# Patient Record
Sex: MALE | Race: WHITE | NOT HISPANIC OR LATINO | Employment: PART TIME | URBAN - METROPOLITAN AREA
[De-identification: names, ages, dates, MRNs, and addresses within clinical notes are randomized per-mention and may not be internally consistent; named-entity substitution may affect disease eponyms.]

---
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
## 2022-02-17 ENCOUNTER — TELEPHONE (OUTPATIENT)
Dept: BEHAVIORAL HEALTH | Facility: CLINIC | Age: 22
End: 2022-02-17
Payer: COMMERCIAL

## 2022-02-21 ENCOUNTER — HOSPITAL ENCOUNTER (OUTPATIENT)
Dept: BEHAVIORAL HEALTH | Facility: CLINIC | Age: 22
Discharge: HOME | End: 2022-02-21
Attending: FAMILY MEDICINE | Admitting: FAMILY MEDICINE
Payer: COMMERCIAL

## 2022-02-21 ENCOUNTER — TELEPHONE (OUTPATIENT)
Dept: BEHAVIORAL HEALTH | Facility: CLINIC | Age: 22
End: 2022-02-21
Payer: COMMERCIAL

## 2022-02-21 DIAGNOSIS — F41.1 GENERALIZED ANXIETY DISORDER: Primary | ICD-10-CM

## 2022-02-21 DIAGNOSIS — F33.41 MAJOR DEPRESSIVE DISORDER, RECURRENT EPISODE, IN PARTIAL REMISSION (H): ICD-10-CM

## 2022-02-21 PROCEDURE — 90791 PSYCH DIAGNOSTIC EVALUATION: CPT | Mod: GT,95 | Performed by: COUNSELOR

## 2022-02-21 RX ORDER — VENLAFAXINE HYDROCHLORIDE 150 MG/1
1 CAPSULE, EXTENDED RELEASE ORAL DAILY
COMMUNITY
Start: 2022-02-11 | End: 2023-03-13

## 2022-02-21 RX ORDER — DEXTROAMPHETAMINE SACCHARATE, AMPHETAMINE ASPARTATE MONOHYDRATE, DEXTROAMPHETAMINE SULFATE AND AMPHETAMINE SULFATE 3.75; 3.75; 3.75; 3.75 MG/1; MG/1; MG/1; MG/1
CAPSULE, EXTENDED RELEASE ORAL
COMMUNITY
Start: 2022-02-11

## 2022-02-21 ASSESSMENT — ANXIETY QUESTIONNAIRES
5. BEING SO RESTLESS THAT IT IS HARD TO SIT STILL: NEARLY EVERY DAY
3. WORRYING TOO MUCH ABOUT DIFFERENT THINGS: NEARLY EVERY DAY
1. FEELING NERVOUS, ANXIOUS, OR ON EDGE: NEARLY EVERY DAY
7. FEELING AFRAID AS IF SOMETHING AWFUL MIGHT HAPPEN: NEARLY EVERY DAY
4. TROUBLE RELAXING: NEARLY EVERY DAY
GAD7 TOTAL SCORE: 21
GAD7 TOTAL SCORE: 21
6. BECOMING EASILY ANNOYED OR IRRITABLE: NEARLY EVERY DAY
GAD7 TOTAL SCORE: 21
2. NOT BEING ABLE TO STOP OR CONTROL WORRYING: NEARLY EVERY DAY
7. FEELING AFRAID AS IF SOMETHING AWFUL MIGHT HAPPEN: NEARLY EVERY DAY

## 2022-02-21 ASSESSMENT — PATIENT HEALTH QUESTIONNAIRE - PHQ9
SUM OF ALL RESPONSES TO PHQ QUESTIONS 1-9: 27
10. IF YOU CHECKED OFF ANY PROBLEMS, HOW DIFFICULT HAVE THESE PROBLEMS MADE IT FOR YOU TO DO YOUR WORK, TAKE CARE OF THINGS AT HOME, OR GET ALONG WITH OTHER PEOPLE: EXTREMELY DIFFICULT
SUM OF ALL RESPONSES TO PHQ QUESTIONS 1-9: 27

## 2022-02-21 ASSESSMENT — COLUMBIA-SUICIDE SEVERITY RATING SCALE - C-SSRS
1. IN THE PAST MONTH, HAVE YOU WISHED YOU WERE DEAD OR WISHED YOU COULD GO TO SLEEP AND NOT WAKE UP?: YES
3. HAVE YOU BEEN THINKING ABOUT HOW YOU MIGHT KILL YOURSELF?: NO
6. HAVE YOU EVER DONE ANYTHING, STARTED TO DO ANYTHING, OR PREPARED TO DO ANYTHING TO END YOUR LIFE?: NO
2. HAVE YOU ACTUALLY HAD ANY THOUGHTS OF KILLING YOURSELF IN THE PAST MONTH?: NO
5. HAVE YOU STARTED TO WORK OUT OR WORKED OUT THE DETAILS OF HOW TO KILL YOURSELF? DO YOU INTEND TO CARRY OUT THIS PLAN?: NO
4. HAVE YOU HAD THESE THOUGHTS AND HAD SOME INTENTION OF ACTING ON THEM?: NO

## 2022-02-21 NOTE — PROGRESS NOTES
"[facility]  Provider Name:  Samanta Little     Credentials:  [facility]    PATIENT'S NAME: Elan Rao  PREFERRED NAME: Elan  PRONOUNS: he/him/his     MRN: [id number]  : 2000  ADDRESS: [address] [place] [id number]  ACCT. NUMBER:  [id number]  DATE OF SERVICE: 22  START TIME: 1100 am  END TIME: 1230 pm  PREFERRED PHONE: [phone]  May we leave a program related message: Yes  SERVICE MODALITY:  Video Visit:      Provider verified identity through the following two step process.  Patient provided:  Patient  and Patient address    Telemedicine Visit: The patient's condition can be safely assessed and treated via synchronous audio and visual telemedicine encounter.      Reason for Telemedicine Visit: Services only offered telehealth    Originating Site (Patient Location): Patient's home    Distant Site (Provider Location): Provider Remote Setting- Home Office    Consent:  The patient/guardian has verbally consented to: the potential risks and benefits of telemedicine (video visit) versus in person care; bill my insurance or make self-payment for services provided; and responsibility for payment of non-covered services.     Patient would like the video invitation sent by:  My Chart    Mode of Communication:  Video Conference via well    As the provider I attest to compliance with applicable laws and regulations related to telemedicine.    UNIVERSAL ADULT Mental Health DIAGNOSTIC ASSESSMENT    Identifying Information:  Patient is a 21 year old,  .  The pronoun use throughout this assessment reflects the patient's chosen pronoun.  Patient was referred for an assessment by hospital.  Patient attended the session alone.    Chief Complaint:   The reason for seeking services at this time is: \"Depression/Anxiety\".  The problem(s) began 21.    Patient has attempted to resolve these concerns in the past through " "psychiatry.    Social/Family History:  Patient reported they grew up in [place]  .  They were raised by biological parents  .  Parents  / .  Patient reported that their childhood was \"tough - diagnosed with Tourette's and had social anxiety\".  Patient described their current relationships with family of origin as \"mom is really supportive and helpful - dad is not as healthy\".     The patient describes their cultural background as .  Cultural influences and impact on patient's life structure, values, norms, and healthcare: Non Baptism.  Contextual influences on patient's health include: Individual Factors Chronic Anxiety.    These factors will be addressed in the Preliminary Treatment plan. Patient identified their preferred language to be English. Patient reported they does not need the assistance of an  or other support involved in therapy.     Patient reported had no significant delays in developmental tasks.   Patient's highest education level was high school graduate  .  Patient identified the following learning problems: attention and concentration.  Modifications will not be used to assist communication in therapy.  Patient reports they are  able to understand written materials.    Patient reported the following relationship history:  Patient has been \"single my whole life\".  Patient's current relationship status is single for \"my whole\".   Patient identified their sexual orientation as heterosexual.  Patient reported having   0 child(alexandria). Patient identified parents; pets as part of their support system.  Patient identified the quality of these relationships as fair,  .      Patient's current living/housing situation involves staying in own home/apartment.  Patient reports living in an apartment with \"one my best friends\" - patient reports staying with mom when having \"bad days\" and they report that housing is stable.    Patient is currently employed fulltime.  " Patient reports their finances are obtained through employment. Patient does not identify finances as a current stressor.      Patient reported that they have not been involved with the legal system.  Patient does not report being under probation/ parole/ jurisdiction. They are not under any current court jurisdiction. .    Patient's Strengths and Limitations:  Patient identified the following strengths or resources that will help them succeed in treatment: commitment to health and well being. Things that may interfere with the patient's success in treatment include: none identified.     Assessments:  The following assessments were completed by patient for this visit:  PHQ9:   PHQ-9 SCORE 2/20/2020 10/5/2021 2/21/2022   PHQ-9 Total Score MyChart - - 27 (Severe depression)   PHQ-9 Total Score - 24 27   PHQ-A Total Score 4 - -     GAD7:   MIGUEL A-7 SCORE 10/5/2021 2/21/2022   Total Score - 21 (severe anxiety)   Total Score 21 21   Answers for HPI/ROS submitted by the patient on 2/21/2022  If you checked off any problems, how difficult have these problems made it for you to do your work, take care of things at home, or get along with other people?: Extremely difficult  PHQ9 TOTAL SCORE: 27  MIGUEL A 7 TOTAL SCORE: 21  CAGE-AID:   CAGE-AID Total Score 2/21/2022   Total Score 0     Harris Suicide Severity Rating Scale (Lifetime/Recent)  Harris Suicide Severity Rating (Lifetime/Recent) 2/21/2022   Q1 Wished to be Dead (Past Month) yes   Q2 Suicidal Thoughts (Past Month) no   Q3 Suicidal Thought Method no   Q4 Suicidal Intent without Specific Plan no   Q5 Suicide Intent with Specific Plan no   Q6 Suicide Behavior (Lifetime) no   Level of Risk per Screen low risk     WHODAS 2.0 Total Score 2/21/2022   Total Score 42   Total Score MyChart 42     Personal and Family Medical History:  Patient does report a family history of mental health concerns.  Patient reports family history includes Asthma in his mother; Depression in his  "father; Schizophrenia in his father; Substance Abuse in his father..     Patient does report Mental Health Diagnosis and/or Treatment.  Patient Patient reported the following previous diagnoses which include(s): ADHD; an anxiety disorder; depression; obsessive compulsive disorder .  Patient reported symptoms began \"my whole life\".  Patient has received mental health services in the past:  therapy; Behavioral Health Clinician; psychiatry  .  Psychiatric Hospitalizations: none .   Currently, patient is receiving other mental health services.  These include psychiatry with Ashley NUÑEZ with [facility].  Next appointment: in the next couple of days.       Patient has had a physical exam to rule out medical causes for current symptoms.  Date of last physical exam was within the past year. Client was encouraged to follow up with PCP if symptoms were to develop. The patient has a [place] Primary Care Provider, who is named Manuelito Delacruz MD  .  Patient reports no current medical and/or dental concerns.  Patient denies any issues with pain..   There are not significant appetite / nutritional concerns / weight changes.   Patient does not report a history of head injury / trauma / cognitive impairment.      Patient reports current meds as:   Outpatient Medications Marked as Taking for the 2/21/22 encounter (Hospital Encounter) with Samanta Little [facility]   Medication Sig     amphetamine-dextroamphetamine (ADDERALL XR) 15 MG 24 hr capsule      clonazePAM (KLONOPIN) 1 MG tablet      cloNIDine (CATAPRES) 0.1 MG tablet Take 1 tablet (0.1 mg) by mouth At Bedtime. May also take 0.5 tablets (0.05 mg) at bedtime as needed, may repeat once (for insomnia in the middle of the night).     Melatonin (MELATONIN TR) 10 MG TBCR Take 1 tablet by mouth every evening     traZODone (DESYREL) 50 MG tablet Take 1 tablet by mouth 1 hour before bedtime     venlafaxine (EFFEXOR-XR) 150 MG 24 hr capsule Take 1 capsule by mouth daily "       Medication Adherence:  Patient reports taking.  taking prescribed medications as prescribed.    Patient Allergies:  No Known Allergies    Medical History:    Past Medical History:   Diagnosis Date     Tourette syndrome          Current Mental Status Exam:   Appearance:  Appropriate    Eye Contact:  Good   Psychomotor:  Normal       Gait / station:  no problem  Attitude / Demeanor: Cooperative  Interested  Speech      Rate / Production: Normal/ Responsive      Volume:  Normal  volume      Language:  intact and no problems  Mood:   Normal  Affect:   Appropriate    Thought Content: Clear   Thought Process: Logical       Associations: No loosening of associations  Insight:   Good   Judgment:  Intact   Orientation:  All  Attention/concentration: Good      Substance Use:  Patient did report a family history of substance use concerns; see medical history section for details.  Patient has not received chemical dependency treatment in the past.  Patient has ever been to detox.      Patient is not currently receiving any chemical dependency treatment.         Substance History of use Age of first use Date of last use     Pattern and duration of use (include amounts and frequency)   Alcohol never used       REPORTS SUBSTANCE USE: N/A   Cannabis   currently use 20 02/20/22 REPORTS SUBSTANCE USE: reports using substance 2 times per day and has .25 to .5 tablet . at a time.   Patient reports heaviest use is current use.   Patient reports using medical marijuana and uses .25 tabs in the morning and half a tab at night for tourettes syndrome.  Patient reports it helps with anxiety and focus.   Amphetamines   Never used outside of prescription    REPORTS SUBSTANCE USE: N/A   Cocaine/crack    never used       REPORTS SUBSTANCE USE: N/A   Hallucinogens never used         REPORTS SUBSTANCE USE: N/A   Inhalants never used         REPORTS SUBSTANCE USE: N/A   Heroin never used         REPORTS SUBSTANCE USE: N/A   Other Opiates  never used     REPORTS SUBSTANCE USE: N/A   Benzodiazepine   Currently prescriped and denies use outside of prescriptions   REPORTS SUBSTANCE USE: N/A   Barbiturates never used     REPORTS SUBSTANCE USE: N/A   Over the counter meds never used     REPORTS SUBSTANCE USE: N/A   Caffeine currently use 14  2/21/22 REPORTS SUBSTANCE USE: reports using substance 2 times per day and has 1 cup of coffe at a time.   Patient reports heaviest use was in the past year and patient has cut down use of energy drinks.   Nicotine  never used     REPORTS SUBSTANCE USE: N/A   Other substances not listed above:  Identify:  never used     REPORTS SUBSTANCE USE: N/A     Patient reported the following problems as a result of their substance use: family problems.    Substance Use: No symptoms    Based on the negative CAGE score and clinical interview there  are not indications of drug or alcohol abuse.  Patient reports concern with caffeine intake and mom has made comments regarding use.  Patient reports cutting down use.    Significant Losses / Trauma / Abuse / Neglect Issues:   Patient did not  serve in the .  There are indications or report of significant loss, trauma, abuse or neglect issues related to: are no indications and client denies any losses, trauma, abuse, or neglect concerns.  Concerns for possible neglect are not present.     Safety Assessment:   Patient denies current homicidal ideation and behaviors.  Patient denies current self-injurious ideation and behaviors.    Patient denied risk behaviors associated with substance use.  Patient denies any high risk behaviors associated with mental health symptoms.  Patient reports the following current concerns for their personal safety: None.  Patient reports there are not firearms in the house.    Patient denies having firearms.    History of Safety Concerns:  Patient denied a history of homicidal ideation.     Patient denied a history of personal safety concerns.     Patient denied a history of assaultive behaviors.    Patient denied a history of sexual assault behaviors.     Patient denied a history of risk behaviors associated with substance use.  Patient denies any history of high risk behaviors associated with mental health symptoms.  Patient reports the following protective factors: forward or future oriented thinking; dedication to family or friends; safe and stable environment; regular sleep; effectively controls impulses; regular physical activity; sense of belonging; purpose; help seeking behaviors when distressed; adherence with prescribed medication; agreement to use safety plan; living with other people; daily obligations; sense of meaning; healthy fear of risky behaviors or pain; financial stability; sense of personal control or determination; access to a variety of clinical interventions and pets    Risk Plan:  See Recommendations for Safety and Risk Management Plan    Review of Symptoms per patient report:  Depression: Change in sleep, Lack of interest, Excessive or inappropriate guilt, Change in energy level, Difficulties concentrating, Change in appetite, Low self-worth, Ruminations, Irritability, Feeling sad, down, or depressed and Withdrawn  Ivonne:  No Symptoms  Psychosis: No Symptoms  Anxiety: Excessive worry, Nervousness, Physical complaints, such as headaches, stomachaches, muscle tension, Social anxiety, Sleep disturbance, Psychomotor agitation, Ruminations, Poor concentration and Irritability  Panic:  Palpitations, Shortness of breath and Sense of impending doom  Post Traumatic Stress Disorder:  No Symptoms   Eating Disorder: No Symptoms  ADD / ADHD:  Inattentive, Difficulties listening, Poor task completion, Poor organizational skills, Distractibility, Restlessness/fidgety, Hyperverbal and Hyperactive  Conduct Disorder: No symptoms  Autism Spectrum Disorder: No symptoms  Obsessive Compulsive Disorder: Checking, Cleaning, Counting and Symetry  Patient  "reports they cant sleep unless bed is pushed against the wall.  Patient reports that they cant sit down unless things are a certain way around them.  Patient reports \"obsessively rearranging items every day\".    Patient reports the following compulsive behaviors and treatment history: Patient reports pulling his fair out at times.      Diagnostic Criteria:   Generalized Anxiety Disorder  A. Excessive anxiety and worry about a number of events or activities (such as work or school performance).   B. The person finds it difficult to control the worry.  C. Select 3 or more symptoms (required for diagnosis). Only one item is required in children.   - Restlessness or feeling keyed up or on edge.    - Being easily fatigued.    - Difficulty concentrating or mind going blank.    - Irritability.    - Muscle tension.    - Sleep disturbance (difficulty falling or staying asleep, or restless unsatisfying sleep).   D. The focus of the anxiety and worry is not confined to features of an Axis I disorder.  E. The anxiety, worry, or physical symptoms cause clinically significant distress or impairment in social, occupational, or other important areas of functioning.   F. The disturbance is not due to the direct physiological effects of a substance (e.g., a drug of abuse, a medication) or a general medical condition (e.g., hyperthyroidism) and does not occur exclusively during a Mood Disorder, a Psychotic Disorder, or a Pervasive Developmental Disorder. Major Depressive Disorder  CRITERIA (A-C) REPRESENT A MAJOR DEPRESSIVE EPISODE - SELECT THESE CRITERIA  A) Recurrent episode(s) - symptoms have been present during the same 2-week period and represent a change from previous functioning 5 or more symptoms (required for diagnosis)   - Depressed mood. Note: In children and adolescents, can be irritable mood.     - Diminished interest or pleasure in all, or almost all, activities.    - Fatigue or loss of energy.    - Feelings of " worthlessness or inappropriate guilt.    - Diminished ability to think or concentrate, or indecisiveness.    - Recurrent thoughts of death (not just fear of dying), recurrent suicidal ideation without a specific plan, or a suicide attempt or a specific plan for committing suicide.   B) The symptoms cause clinically significant distress or impairment in social, occupational, or other important areas of functioning  C) The episode is not attributable to the physiological effects of a substance or to another medical condition  D) The occurence of major depressive episode is not better explained by other thought / psychotic disorders  E) There has never been a manic episode or hypomanic episode Obsessive Compulsive Disorder Criteria: Obsessive Compulsive Disorder  !!!FYI: MUST MEET FULL CRITERIA FOR EITHER OBSESSIONS OR COMPULSIONS!!!  Client experiences Obsessions as defined by (1), (2), (3), (4):    (1) recurrent and persistent thoughts, impulses, or images that are experienced, at some time during the disturbance, as intrusive and inappropriate and that cause marked anxiety or distress     (2) the thoughts, impulses, or images are not simply excessive worries about real-life problems     (3) the client attempts to ignore or suppress such thoughts, impulses, or images, or to neutralize them with some other thought or action     (4) the client recognizes that the obsessional thoughts, impulses, or images are a product of his or her own mind (not imposed from without as in thought insertion)     (1) repetitive behaviors (e.g., hand washing, ordering, checking) or mental acts (e.g., praying, counting, repeating words silently) that the person feels driven to perform in response to an obsession, or according to rules that must be applied rigidly     (2) the behaviors or mental acts are aimed at preventing or reducing distress or preventing some dreaded event or situation; however, these behaviors or mental acts either are  not connected in a realistic way with what they are designed to neutralize or prevent or are clearly excessive   At some point during the course of the disorder, the person has recognized that the obsessions or compulsions are excessive or unreasonable  The obsessions or compulsions cause marked distress, are time consuming (take more than 1 hour a day), or significantly interfere with the person's normal routine, occupational (or academic) functioning, or usual social activities or relationships.   The content of the obsessions or compulsions are not restricted to another Axis I Disorder (e.g., preoccupation with food in the presence of an Eating Disorders; hair pulling in the presence of Trichotillomania; concern with appearance in the presence of Body Dysmorphic Disorder; preoccupation with drugs in the presence of a Substance Use Disorder; preoccupation with having a serious illness in the presence of Hypochondriasis; preoccupation with sexual urges or fantasies in the presence of a Paraphilia; or guilty ruminations in the presence of Major Depressive Disorder).   The disturbance is not due to the direct physiological effects of a substance (e.g., a drug of abuse, a medication) or a general medical condition    Functional Status:  Patient reports the following functional impairments:  management of the household and or completion of tasks, self-care and social interactions.     Nonprogrammatic care:  Patient is requesting basic services to address current mental health concerns.    Clinical Summary:  1. Reason for assessment: to determine mental health needs  .  2. Psychosocial, Cultural and Contextual Factors: Chronic Anxiety  .  3. Principal DSM5 Diagnoses  (Sustained by DSM5 Criteria Listed Above):   300.02 (F41.1) Generalized Anxiety Disorder  300.3 (F42) Obsessive Compulsive Disorder.  4. Other Diagnoses that is relevant to services:   296.32 (F33.1) Major Depressive Disorder, Recurrent Episode, Moderate _  and With anxious distress.  5. Provisional Diagnosis:  Attention-Deficit/Hyperactivity Disorder  314.01 (F90.1) Predominantly hyperactive / impulsive presentation In Partial remission as evidenced by patients history and chart review .  6. Prognosis: Return to Normal Functioning.  7. Likely consequences of symptoms if not treated: higher level of care.  8. Client strengths include:  committed to sobriety, creative, goal-focused, good listener, insightful, intelligent, motivated and open to learning .     Recommendations:     1. Plan for Safety and Risk Management:   Recommended that patient call 911 or go to the local ED should there be a change in any of these risk factors.         Report to child / adult protection services was NA.     2. Patient's identified none identified.     3. Initial Treatment will focus on:    Depressed Mood   Anxiety      4. Resources/Service Plan:    services are not indicated.   Modifications to assist communication are not indicated.   Additional disability accommodations are not indicated.      5. Collaboration:   Collaboration / coordination of treatment will be initiated with the following  support professionals: primary care physician and psychiatry.      6.  Referrals:   The following referral(s) will be initiated: Outpatient Mental Reading Therapy  Transition Clinic. Next Scheduled Appointment: Referrals have been placed through [facility].     A Release of Information has been obtained for the following: Emergency Contact.    7. BIENVENIDO:    BIENVENIDO:  Discussed the general effects of drugs and alcohol on health and well-being. Provider gave patient printed information about the effects of chemical use on their health and well being. Recommendations:  To abstain from all mood altering substances .     8. Records:   These were reviewed at time of assessment.   Information in this assessment was obtained from the medical record and  provided by patient who is a good  "historian.    Patient will have open access to their mental health medical record.    Clinical substantiation:  Patient is interested in pursuing individual therapy.  A referral has been placed through [facility] for individual therapy and the Transition Clinic.  Patient denied referral for programmatic care.   discussed programming and options with patient stating that they would like to try individual therapy first.  Patient agreed to follow up with  should needs change and/or becomes interested in programmatic care.  Patient developed safety plan with  with it sent through [employer].  Patient denied safety concerns and reports ability to keep self safe.    Provider Name/ Credentials:  Samanta Little [facility]  February 21, 2022      Outpatient Mental Health Services - Adult    MY COPING PLAN FOR SAFETY    PATIENT'S NAME: Elan Rao  MRN:   [id number]    SAFETY PLAN:    Step 1: Warning signs / cues (Thoughts, images, mood, situation, behavior) that a crisis may be developing:      Thoughts: \"I can't do this anymore\", \"I just want this to end\" and \"Nothing makes it better\"\"how unimportant I am\"    Images: death    Thinking Processes: ruminations (can't stop thinking about my problems)and highly critical and negative thoughts    Mood: worsening depression and intense worry    Behaviors: isolating/withdrawing     Situations: Being around strangers       Step 2: Coping strategies - Things I can do to take my mind off of my problems without contacting another person (relaxation technique, physical activity):      Physical Activities: yoga, meditation, deep breathing and exercises    Step 3: People and social settings that provide distraction:     Name: mom  Phone: in phone   Name: dad Phone: in phone   work     Step 4: Remind myself of people and things that are important to me and worth living for:  \"the people in my life\"    Step 5: When I am in crisis, I can ask these people to help me " use my safety plan:     Name: mom Phone: in phone   Name: dad Phone: in phone    Step 6: Making the environment safe:       be around others    Step 7: Professionals or agencies I can contact during a crisis:      Suicide Prevention Lifeline: 9-513-632-TALK (8264)    Crisis Text Line Service (available 24 hours a day, 7 days a week): Text [place] to 607990    Call  **CRISIS (120691) from a cell phone to talk to a team of professionals who can help you.    Crisis Services By [facility]: Phone Number:   [place]     [phone]   [place]    [phone]   [place]    [phone]   [place]    [phone]   [place]    [phone]   [place] [phone]   [place]     [phone]       Call 911 or go to my nearest emergency department.     I helped develop this safety plan and agree to use it when needed.  I have been given a copy of this plan.      Client signature _________________________________________________________________  Today s date:  2/21/2022  Adapted from Safety Plan Template 2008 Justina Walker and Kofi Mcnulty is reprinted with the express permission of the authors.  No portion of the Safety Plan Template may be reproduced without the express, written permission.  You can contact the authors at [email or web address] or [email or web address]

## 2022-02-21 NOTE — TELEPHONE ENCOUNTER
"Reached patient who stated they prefer a male therapist and someone who is not \"overly cheery\". Patient scheduled for therapy with TC 2/25/2022.    Monisha Ambrocio  Transition Clinic Coordinator    ----- Message from THONG Salas sent at 2/21/2022  2:45 PM CST -----  Regarding: referral  Transition Clinic Referral   [place] Only     Type of Referral:      ___X__Therapy    _____Therapy & Medication (Therapy will be scheduled first)  _____Medication Only  _____Diagnostic Assessment Only      Referring Provider Name: THONG Salas    Clinician completing the assessment.     Referring Provider: [facility] PROVIDER    If known, referring provider contact name: Samanta Little; Phone Number: [phone]    Reason for Transition Clinic Referral: bridge services    Next Level of Care Patient Will Be Transitioned To: individual therapy    Start Date for Next Level of Care Therapy (Required): referral placed through [facility]      Start Date for Next Level of Care Medication (Required): NA     What Would Be Helpful from the Transition Clinic: bridge services     Needs: NO    Does Patient Have Access to Technology: yes    Patient E-mail Address: [email or web address]    Current Patient Phone Number: [phone];     Clinician Gender Preference (if applicable): NO    THONG Salas       "

## 2022-02-22 ASSESSMENT — PATIENT HEALTH QUESTIONNAIRE - PHQ9: SUM OF ALL RESPONSES TO PHQ QUESTIONS 1-9: 27

## 2022-02-22 ASSESSMENT — ANXIETY QUESTIONNAIRES: GAD7 TOTAL SCORE: 21

## 2022-02-25 ENCOUNTER — VIRTUAL VISIT (OUTPATIENT)
Dept: BEHAVIORAL HEALTH | Facility: CLINIC | Age: 22
End: 2022-02-25
Payer: COMMERCIAL

## 2022-02-25 DIAGNOSIS — F42.9 OBSESSIVE-COMPULSIVE DISORDER, UNSPECIFIED TYPE: ICD-10-CM

## 2022-02-25 DIAGNOSIS — F33.1 MAJOR DEPRESSIVE DISORDER, RECURRENT EPISODE, MODERATE (H): ICD-10-CM

## 2022-02-25 DIAGNOSIS — F41.1 GENERALIZED ANXIETY DISORDER: Primary | ICD-10-CM

## 2022-02-25 NOTE — PROGRESS NOTES
[facility]   Mental Health & Addiction Services     Progress Note - Initial Visit    Patient  Name:  Elan Rao Date: 2022         Service Type: Individual     Visit Start Time: 830  Visit End Time: 930    Visit #: 1    Attendees: Client attended alone    Service Modality:  Video Visit:      Provider verified identity through the following two step process.  Patient provided:  Patient     Telemedicine Visit: The patient's condition can be safely assessed and treated via synchronous audio and visual telemedicine encounter.      Reason for Telemedicine Visit: Services only offered telehealth    Originating Site (Patient Location): Patient's home    Distant Site (Provider Location): [facility]: Transition Clinic    Consent:  The patient/guardian has verbally consented to: the potential risks and benefits of telemedicine (video visit) versus in person care; bill my insurance or make self-payment for services provided; and responsibility for payment of non-covered services.     Patient would like the video invitation sent by:  Send to e-mail at: [email or web address]    Mode of Communication:  Video Conference via [employer]    As the provider I attest to compliance with applicable laws and regulations related to telemedicine.       DATA:   Interactive Complexity: No   Crisis: No     Presenting Concerns/  Current Stressors:   Pt referred for bridging services due to extensive waitlist for [facility]. Previous DA indicates pt struggles with significant anxious, obsessive, and depressive spectrum sx, and recently was admitted to the ED 2022 correlated with worsening depressive sx and SI.     In session, pt echoes above sx, further describing generalized anhedonia and difficulty building meaningful social relationships as his primary sources of stress and perceived dysfunction at current. Pt declines review of informed consent and nature of / limitations to confidentiality  at this time, citing familiarity due to an extensive hx of MH tx. Pt appears to demonstrate problem-focused thought at baseline and intellectualize his experience of MH sx, and appears to recognize the maladaptive nature of these restrictive patterns, while having difficulty interrupting current inertia.      ASSESSMENT:  Mental Status Assessment:  Appearance:   Appropriate   Eye Contact:   Fair   Psychomotor Behavior: Normal   Attitude:   Cooperative   Orientation:   All  Speech   Rate / Production: Normal/ Responsive   Volume:  Normal   Mood:    Anxious   Affect:    Appropriate   Thought Content:  Clear   Thought Form:  Coherent  Goal Directed  Logical   Insight:    Good       Safety Issues and Plan for Safety and Risk Management:     [place] Suicide Severity Rating Scale (Short Version)  [place] Suicide Severity Rating (Short Version) 2/21/2022   Q1 Wished to be Dead (Past Month) yes   Q2 Suicidal Thoughts (Past Month) no   Q3 Suicidal Thought Method no   Q4 Suicidal Intent without Specific Plan no   Q5 Suicide Intent with Specific Plan no   Q6 Suicide Behavior (Lifetime) no   Level of Risk per Screen low risk     Patient denies current fears or concerns for personal safety.  Patient denies current or recent suicidal ideation or behaviors.  Patient denies current or recent homicidal ideation or behaviors.  Patient denies current or recent self injurious behavior or ideation.  Patient denies other safety concerns.  Recommended that patient call 911 or go to the local ED should there be a change in any of these risk factors.  Patient reports there are no firearms in the house.     Diagnostic Criteria:  See recent DA by Samanta Kasper [facility]      DSM5 Diagnoses: (Sustained by DSM5 Criteria Listed Above)  Diagnoses: 296.32 (F33.1) Major Depressive Disorder, Recurrent Episode, Moderate _  300.02 (F41.1) Generalized Anxiety Disorder  300.3 (F42) Obsessive Compulsive Disorder  Psychosocial & Contextual Factors: Social  isolation, ongoing effects of COVID-19 pandemic, life stage changes  WHODAS 2.0 (12 item):   WHODAS 2.0 Total Score 2/21/2022   Total Score 42   Total Score MyChart 42     Intervention:   Mindfulness- Patient was educated on relaxation and mindfulness techniques and Educated on treatment planning and started identifying goals and interventions for treatment plan  Collateral Reports Completed:  Not Applicable      PLAN: (Homework, other):  1. Provider will continue Diagnostic Assessment.  Patient was given the following to do until next session:  Pt to follow-up on assigned HW, and attend session w/ potential next LoC 1300 03.01.2022, which was scheduled at pt's request due to extensive waitlist for [facility].    2. Provider recommended the following referrals: OP counseling scheduled w/ [facility] 03.01.2022.      3.  Suicide Risk and Safety Concerns were assessed for Elan Rao.    Patient meets the following risk assessment and triage: Patient has no change in safety concerns. Committed to safety and agreed to follow previously developed safety plan.      PAPITO RAZA, [facility]  February 25, 2022

## 2022-03-13 ENCOUNTER — HEALTH MAINTENANCE LETTER (OUTPATIENT)
Age: 22
End: 2022-03-13

## 2022-08-30 ENCOUNTER — LAB (OUTPATIENT)
Dept: LAB | Facility: CLINIC | Age: 22
End: 2022-08-30
Payer: COMMERCIAL

## 2022-08-30 DIAGNOSIS — F32.2 SEVERE MAJOR DEPRESSION WITHOUT PSYCHOTIC FEATURES (H): Primary | ICD-10-CM

## 2022-08-30 LAB
ALBUMIN SERPL-MCNC: 4.3 G/DL (ref 3.4–5)
ALBUMIN UR-MCNC: NEGATIVE MG/DL
ALP SERPL-CCNC: 71 U/L (ref 40–150)
ALT SERPL W P-5'-P-CCNC: 35 U/L (ref 0–70)
ANION GAP SERPL CALCULATED.3IONS-SCNC: 4 MMOL/L (ref 3–14)
APPEARANCE UR: CLEAR
AST SERPL W P-5'-P-CCNC: 22 U/L (ref 0–45)
BACTERIA #/AREA URNS HPF: NORMAL /HPF
BILIRUB SERPL-MCNC: 0.6 MG/DL (ref 0.2–1.3)
BILIRUB UR QL STRIP: NEGATIVE
BUN SERPL-MCNC: 12 MG/DL (ref 7–30)
CALCIUM SERPL-MCNC: 9 MG/DL (ref 8.5–10.1)
CHLORIDE BLD-SCNC: 102 MMOL/L (ref 94–109)
CHOLEST SERPL-MCNC: 132 MG/DL
CO2 SERPL-SCNC: 29 MMOL/L (ref 20–32)
COLOR UR AUTO: YELLOW
CREAT SERPL-MCNC: 0.86 MG/DL (ref 0.66–1.25)
ERYTHROCYTE [DISTWIDTH] IN BLOOD BY AUTOMATED COUNT: 13.1 % (ref 10–15)
FASTING STATUS PATIENT QL REPORTED: NO
GFR SERPL CREATININE-BSD FRML MDRD: >90 ML/MIN/1.73M2
GLUCOSE BLD-MCNC: 109 MG/DL (ref 70–99)
GLUCOSE UR STRIP-MCNC: NEGATIVE MG/DL
HBA1C MFR BLD: 5 % (ref 0–5.6)
HCT VFR BLD AUTO: 46.4 % (ref 40–53)
HDLC SERPL-MCNC: 34 MG/DL
HGB BLD-MCNC: 16.4 G/DL (ref 13.3–17.7)
HGB UR QL STRIP: NEGATIVE
KETONES UR STRIP-MCNC: NEGATIVE MG/DL
LDLC SERPL CALC-MCNC: 85 MG/DL
LEUKOCYTE ESTERASE UR QL STRIP: NEGATIVE
MCH RBC QN AUTO: 30.1 PG (ref 26.5–33)
MCHC RBC AUTO-ENTMCNC: 35.3 G/DL (ref 31.5–36.5)
MCV RBC AUTO: 85 FL (ref 78–100)
NITRATE UR QL: NEGATIVE
NONHDLC SERPL-MCNC: 98 MG/DL
PH UR STRIP: 6.5 [PH] (ref 5–7)
PLATELET # BLD AUTO: 223 10E3/UL (ref 150–450)
POTASSIUM BLD-SCNC: 4.3 MMOL/L (ref 3.4–5.3)
PROT SERPL-MCNC: 7.1 G/DL (ref 6.8–8.8)
RBC # BLD AUTO: 5.44 10E6/UL (ref 4.4–5.9)
RBC #/AREA URNS AUTO: NORMAL /HPF
SODIUM SERPL-SCNC: 135 MMOL/L (ref 133–144)
SP GR UR STRIP: <=1.005 (ref 1–1.03)
T3FREE SERPL-MCNC: 3.6 PG/ML (ref 2–4.4)
T4 FREE SERPL-MCNC: 0.86 NG/DL (ref 0.76–1.46)
TRIGL SERPL-MCNC: 64 MG/DL
TSH SERPL DL<=0.005 MIU/L-ACNC: 1.28 MU/L (ref 0.4–4)
UROBILINOGEN UR STRIP-ACNC: 0.2 E.U./DL
WBC # BLD AUTO: 6.7 10E3/UL (ref 4–11)
WBC #/AREA URNS AUTO: NORMAL /HPF

## 2022-08-30 PROCEDURE — 85027 COMPLETE CBC AUTOMATED: CPT

## 2022-08-30 PROCEDURE — 83036 HEMOGLOBIN GLYCOSYLATED A1C: CPT

## 2022-08-30 PROCEDURE — 81001 URINALYSIS AUTO W/SCOPE: CPT

## 2022-08-30 PROCEDURE — 36415 COLL VENOUS BLD VENIPUNCTURE: CPT

## 2022-08-30 PROCEDURE — 84443 ASSAY THYROID STIM HORMONE: CPT

## 2022-08-30 PROCEDURE — 80053 COMPREHEN METABOLIC PANEL: CPT

## 2022-08-30 PROCEDURE — 82306 VITAMIN D 25 HYDROXY: CPT

## 2022-08-30 PROCEDURE — 84481 FREE ASSAY (FT-3): CPT

## 2022-08-30 PROCEDURE — 80061 LIPID PANEL: CPT

## 2022-08-30 PROCEDURE — 84439 ASSAY OF FREE THYROXINE: CPT

## 2022-08-31 LAB — DEPRECATED CALCIDIOL+CALCIFEROL SERPL-MC: 26 UG/L (ref 20–75)

## 2023-03-08 ENCOUNTER — TELEPHONE (OUTPATIENT)
Dept: FAMILY MEDICINE | Facility: CLINIC | Age: 23
End: 2023-03-08
Payer: COMMERCIAL

## 2023-03-08 NOTE — TELEPHONE ENCOUNTER
Patient Quality Outreach      Summary:    Patient has the following on his problem list/HM:     Immunizations       Health Maintenance Due   Topic     Flu Vaccine (1)       Patient is due/failing the following:   Physical Annual Wellness Visit      Topic Date Due     HPV Vaccine (2 - Male 2-dose series) 02/08/2014     COVID-19 Vaccine (4 - Booster for Moderna series) 03/22/2022     Flu Vaccine (1) Never done       Type of outreach:    Sent letter.    Questions for provider review:    None                                                                                                                                     Anjelica Guzmán MA

## 2023-03-08 NOTE — LETTER
March 8, 2023      Elan Rao  [address] [place] [address]      Your team at [facility] cares about your health. We have reviewed your chart and based on our findings; we are making the following recommendations to better manage your health.     You are in particular need of attention regarding the following:     PREVENTATIVE VISIT: Physical    If you have already completed these items, please contact the clinic via phone or   MyChart so your care team can review and update your records. Thank you for   choosing [facility] for your healthcare needs. For any questions,   concerns, or to schedule an appointment please contact our clinic.    Healthy Regards,      Your [facility] Care Team

## 2023-03-13 ENCOUNTER — HOSPITAL ENCOUNTER (EMERGENCY)
Facility: CLINIC | Age: 23
Discharge: HOME | End: 2023-03-13
Attending: EMERGENCY MEDICINE | Admitting: EMERGENCY MEDICINE
Payer: COMMERCIAL

## 2023-03-13 VITALS
BODY MASS INDEX: 20.17 KG/M2 | SYSTOLIC BLOOD PRESSURE: 144 MMHG | WEIGHT: 130 LBS | DIASTOLIC BLOOD PRESSURE: 84 MMHG | HEART RATE: 111 BPM | OXYGEN SATURATION: 98 % | RESPIRATION RATE: 18 BRPM | TEMPERATURE: 99 F

## 2023-03-13 DIAGNOSIS — R45.851 SUICIDAL IDEATION: ICD-10-CM

## 2023-03-13 DIAGNOSIS — F42.9 OBSESSIVE-COMPULSIVE DISORDER, UNSPECIFIED TYPE: ICD-10-CM

## 2023-03-13 DIAGNOSIS — F33.1 MAJOR DEPRESSIVE DISORDER, RECURRENT EPISODE, MODERATE (H): ICD-10-CM

## 2023-03-13 LAB — SARS-COV-2 RNA RESP QL NAA+PROBE: NEGATIVE

## 2023-03-13 PROCEDURE — C9803 HOPD COVID-19 SPEC COLLECT: HCPCS

## 2023-03-13 PROCEDURE — U0003 INFECTIOUS AGENT DETECTION BY NUCLEIC ACID (DNA OR RNA); SEVERE ACUTE RESPIRATORY SYNDROME CORONAVIRUS 2 (SARS-COV-2) (CORONAVIRUS DISEASE [COVID-19]), AMPLIFIED PROBE TECHNIQUE, MAKING USE OF HIGH THROUGHPUT TECHNOLOGIES AS DESCRIBED BY CMS-2020-01-R: HCPCS | Performed by: EMERGENCY MEDICINE

## 2023-03-13 PROCEDURE — 90791 PSYCH DIAGNOSTIC EVALUATION: CPT

## 2023-03-13 PROCEDURE — 99285 EMERGENCY DEPT VISIT HI MDM: CPT | Mod: 25

## 2023-03-13 PROCEDURE — 99283 EMERGENCY DEPT VISIT LOW MDM: CPT | Performed by: NURSE PRACTITIONER

## 2023-03-13 RX ORDER — TRAZODONE HYDROCHLORIDE 50 MG/1
50 TABLET, FILM COATED ORAL AT BEDTIME
COMMUNITY
Start: 2022-08-26

## 2023-03-13 RX ORDER — PROPRANOLOL HYDROCHLORIDE 10 MG/1
10 TABLET ORAL 2 TIMES DAILY
COMMUNITY
Start: 2023-01-12

## 2023-03-13 RX ORDER — DEXTROAMPHETAMINE SACCHARATE, AMPHETAMINE ASPARTATE MONOHYDRATE, DEXTROAMPHETAMINE SULFATE AND AMPHETAMINE SULFATE 5; 5; 5; 5 MG/1; MG/1; MG/1; MG/1
1 CAPSULE, EXTENDED RELEASE ORAL EVERY MORNING
COMMUNITY
Start: 2023-03-10

## 2023-03-13 RX ORDER — OLANZAPINE 5 MG/1
5 TABLET ORAL EVERY EVENING
COMMUNITY
Start: 2022-10-31

## 2023-03-13 RX ORDER — DIAZEPAM 5 MG
10 TABLET ORAL 3 TIMES DAILY PRN
COMMUNITY
Start: 2023-03-10

## 2023-03-13 RX ORDER — MIRTAZAPINE 15 MG/1
1 TABLET, FILM COATED ORAL AT BEDTIME
COMMUNITY
Start: 2023-02-01

## 2023-03-13 RX ORDER — VENLAFAXINE HYDROCHLORIDE 37.5 MG/1
CAPSULE, EXTENDED RELEASE ORAL
Qty: 42 CAPSULE | Refills: 1 | Status: SHIPPED | OUTPATIENT
Start: 2023-03-13 | End: 2023-04-10

## 2023-03-13 RX ORDER — LAMOTRIGINE 100 MG/1
2 TABLET ORAL DAILY
COMMUNITY
Start: 2022-10-31

## 2023-03-13 RX ORDER — MIRTAZAPINE 15 MG/1
15 TABLET, FILM COATED ORAL AT BEDTIME
COMMUNITY
Start: 2023-02-27

## 2023-03-13 RX ORDER — DEXTROAMPHETAMINE SACCHARATE, AMPHETAMINE ASPARTATE, DEXTROAMPHETAMINE SULFATE AND AMPHETAMINE SULFATE 1.25; 1.25; 1.25; 1.25 MG/1; MG/1; MG/1; MG/1
10 TABLET ORAL EVERY EVENING
COMMUNITY
Start: 2022-10-31

## 2023-03-13 ASSESSMENT — COLUMBIA-SUICIDE SEVERITY RATING SCALE - C-SSRS
6. HAVE YOU EVER DONE ANYTHING, STARTED TO DO ANYTHING, OR PREPARED TO DO ANYTHING TO END YOUR LIFE?: NO
2. HAVE YOU ACTUALLY HAD ANY THOUGHTS OF KILLING YOURSELF?: YES
2. HAVE YOU ACTUALLY HAD ANY THOUGHTS OF KILLING YOURSELF?: YES
TOTAL  NUMBER OF INTERRUPTED ATTEMPTS LIFETIME: NO
1. IN THE PAST MONTH, HAVE YOU WISHED YOU WERE DEAD OR WISHED YOU COULD GO TO SLEEP AND NOT WAKE UP?: YES
TOTAL  NUMBER OF ABORTED OR SELF INTERRUPTED ATTEMPTS LIFETIME: NO
REASONS FOR IDEATION PAST MONTH: COMPLETELY TO END OR STOP THE PAIN (YOU COULDN'T GO ON LIVING WITH THE PAIN OR HOW YOU WERE FEELING)
4. HAVE YOU HAD THESE THOUGHTS AND HAD SOME INTENTION OF ACTING ON THEM?: NO
3. HAVE YOU BEEN THINKING ABOUT HOW YOU MIGHT KILL YOURSELF?: YES
4. HAVE YOU HAD THESE THOUGHTS AND HAD SOME INTENTION OF ACTING ON THEM?: NO
ATTEMPT LIFETIME: NO
1. HAVE YOU WISHED YOU WERE DEAD OR WISHED YOU COULD GO TO SLEEP AND NOT WAKE UP?: YES
5. HAVE YOU STARTED TO WORK OUT OR WORKED OUT THE DETAILS OF HOW TO KILL YOURSELF? DO YOU INTEND TO CARRY OUT THIS PLAN?: NO
REASONS FOR IDEATION LIFETIME: COMPLETELY TO END OR STOP THE PAIN (YOU COULDN'T GO ON LIVING WITH THE PAIN OR HOW YOU WERE FEELING)
5. HAVE YOU STARTED TO WORK OUT OR WORKED OUT THE DETAILS OF HOW TO KILL YOURSELF? DO YOU INTEND TO CARRY OUT THIS PLAN?: NO

## 2023-03-13 ASSESSMENT — ACTIVITIES OF DAILY LIVING (ADL)
ADLS_ACUITY_SCORE: 35
ADLS_ACUITY_SCORE: 33

## 2023-03-13 NOTE — ED PROVIDER NOTES
[facility] Unit - Psychiatric Consultation  [facility] Emergency Department    Elan Rao MRN: [id number]   Age: 22 year old YOB: 2000     History     Chief Complaint   Patient presents with     Depression     Telemedicine Visit: The patient's condition can be safely assessed and treated via synchronous audio and visual telemedicine encounter.      Reason for Telemedicine Visit: Services only offered telehealth      Originating Site (Patient Location): [facility] emergency department unit    Distant Site (Provider Location): Provider Remote Setting    Consent:  The patient/guardian has verbally consented to: the potential risks and benefits of telemedicine (video visit or phone) versus in person care; bill my insurance or make self-payment for services provided; and responsibility for payment of non-covered services.     Mode of Communication: [employer], a secure HIPAA compliant video platform      HPI  Elan Rao is a 22 year old male with history notable for MDD, MIGUEL A, Bipolar, OCD,Tourette syndrome, and ADHD who presents with his mother to the ED with a chief complaint of depression.  Patient was evaluated by the ED provider, who medically cleared patient to transfer to [facility] for psychiatric assessment, this is reviewed along with all pertinent labs and tests performed.    On examination, patients reports feeling more depressed since the death of his friend 9 months ago. He states he has been isolating, not doing things he once enjoyed, no interest in anything, difficulty focusing, and suicidal thoughts.  He denies any current suicidal thoughts. He is working with a therapist and psychiatrist. He was at his psychiatrist three days ago and recommended to start Wellbutrin. Patient is wondering if there are any other medication options to target his depressive symptoms as he believes he has taken Wellbutrin in the past and questions if it will be effective.    Past Medical History  Past Medical  History:   Diagnosis Date     Tourette syndrome      Past Surgical History:   Procedure Laterality Date     NO HISTORY OF SURGERY       amphetamine-dextroamphetamine (ADDERALL XR) 20 MG 24 hr capsule  amphetamine-dextroamphetamine (ADDERALL) 5 MG tablet  cloNIDine (CATAPRES) 0.1 MG tablet  diazepam (VALIUM) 5 MG tablet  lamoTRIgine (LAMICTAL) 100 MG tablet  mirtazapine (REMERON) 15 MG tablet  OLANZapine (ZYPREXA) 5 MG tablet  propranolol (INDERAL) 10 MG tablet  traZODone (DESYREL) 50 MG tablet  venlafaxine (EFFEXOR XR) 37.5 MG 24 hr capsule  amphetamine-dextroamphetamine (ADDERALL XR) 15 MG 24 hr capsule  Melatonin (MELATONIN TR) 10 MG TBCR  mirtazapine (REMERON) 15 MG tablet  traZODone (DESYREL) 50 MG tablet      No Known Allergies  Family History  Family History   Problem Relation Age of Onset     Asthma Mother      Substance Abuse Father      Depression Father      Schizophrenia Father      Social History   Social History     Tobacco Use     Smoking status: Never     Smokeless tobacco: Never     Tobacco comments:     no exposure   Substance Use Topics     Alcohol use: No     Drug use: No      Past medical history, past surgical history, medications, allergies, family history, and social history were reviewed with the patient. No additional pertinent items.       Review of Systems  A complete review of systems was performed with pertinent positives and negatives noted in the HPI, and all other systems negative.    Physical Examination   BP: (!) 148/80  Pulse: 103  Temp: 98.3  F (36.8  C)  Resp: 18  Weight: 59 kg (130 lb)  SpO2: 100 %    Physical Exam  General: Appears stated age.   Neuro: Alert and fully oriented. Extremities appear to demonstrate normal strength on visual inspection.   Integumentary/Skin: no rash visualized, normal color    Psychiatric Examination   Appearance: awake, alert  Attitude:  cooperative  Eye Contact:  good  Mood:  depressed  Affect:  intensity is blunted  Speech:  monotone, minimal  detail  Psychomotor Behavior:  evidence of tics  Thought Process:  logical and concrete  Associations:  no loose associations  Thought Content:  no evidence of suicidal ideation or homicidal ideation and no evidence of psychotic thought  Insight:  good  Judgement:  intact  Oriented to:  time, person, and place  Attention Span and Concentration:  intact  Recent and Remote Memory:  intact  Language: able to name/identify objects without impairment  Fund of Knowledge: intact with awareness of current and past events    ED Course        Labs Ordered and Resulted from Time of ED Arrival to Time of ED Departure   COVID-19 VIRUS (CORONAVIRUS) BY PCR - Normal       Result Value    SARS CoV2 PCR Negative         Assessments & Plan (with Medical Decision Making)   Patient presenting with suicidal thoughts in the context of major depressive disorder further compounded by unexpected death of childhood friend about 9 months ago. Nursing notes reviewed noting no acute issues.     I have reviewed the assessment completed by the [facility].     The Prescription Drug Monitoring Program (PDMP) database was accessed to verify accuracy of patients prescribed controlled substances.     After a period of working with the treatment team on the EmPATH unit, the patient's mental state improved to allow a safe transition to outpatient care. After counseling on the diagnosis, work-up, and treatment plan, the patient was discharged. Close follow-up with a psychiatrist and/or therapist was recommended and community psychiatric resources were provided. Patient is to return to the ED if any urgent or potentially life-threatening concerns.     At the time of discharge, the patient's acute suicide risk was determined to be low due to the following factors: Reduction in the intensity of mood/anxiety symptoms that preceded the admission, denial of suicidal thoughts, denies feeling helpless or helpless, not currently under the influence of alcohol or  illicit substances, denies experiencing command hallucinations, no immediate access to firearms. The patient's acute risk could be higher if noncompliant with their treatment plan, medications, follow-up appointments or using illicit substances or alcohol. Protective factors include: social supports, family support, stable housing.    Preliminary diagnosis:    ICD-10-CM    1. Suicidal ideation  R45.851       2. Obsessive-compulsive disorder, unspecified type  F42.9       3. Major depressive disorder, recurrent episode, moderate (H)  F33.1            Treatment Plan:  -discharge home with safety plan in place.  -start venlafaxine 37.5 mg every day for 14 days then increase to 75 mg daily to target depression. Patient talks about adverse GI side effects with poor tolerance when taking antidepressants in the past, thus opted to dose low and slow.  -Medication education provided this visit includes, rationale for medication, importance of compliance, medication interactions, and common side effects. Patient agreeable.  -continue to work with established therapist.  -referral for psychiatry for medication management as patient is looking at transferring his care from current provider for unidentified reasons.  -I recommended the patient pursue P450 genotyping to determine to determine whether metabolism errors may be contributing to his resistance to antidepressant medications. The patients outpatient treatment team may consider ordering this test.  -Future treatment considerations may include a referral to [facility] Neuromodulation clinic to aid with additional interventions that may offer alternative modalities to manage depression as patient reports a history mediocre response to psychotropics starting in his teens.  -discussed weaning of valium 10 mg TID educating him this dose is relatively high and likely contributing to his lack of emotion.  -   --  CINDY Carrasco CNP   [facility]  EMERGENCY DEPT  EmPATH Unit  3/13/2023     Caitlin AYOUB APRN, ANDERSON have personally performed an examination of this patient.  I have edited the note to reflect all relevant changes.  I have discussed this patient with the care team on 3/13/23.  I have reviewed all vitals and laboratory findings.        Caitlin Hirsch APRN CNP  03/16/23 2013

## 2023-03-13 NOTE — ED PROVIDER NOTES
History     Chief Complaint:  Depression       HPI   Elan Rao is a 22 year old male with a history of depression, anxiety, and Tourette's syndome who presents with depression. The patient reports that he has been having worsening depression with suicidal ideation. The patient reports that he does not have a plan or intent. He states he does not feel like he is a risk to himself. The patient sees his therapist twice a week and they referred him to the ED. The patient had a hospitalization last spring where he felt really traumatized after. The patient's mom states his depression started in his adolescence and has gotten severely worse the past couple months.     Independent Historian: patient and his mom    Review of External Notes: none     ROS:  Review of Systems  ROS: ROS neg other than the symptoms noted above in the HPI.      Allergies:  No Known Allergies     Medications:    Adderall   Klonopin   Lexapro   Melatonin   Desyrel   Effexor     Past Medical History:    Tourette syndrome  Depression   MIGUEL A   Insomnia     Family History:    family history includes Asthma in his mother; Depression in his father; Schizophrenia in his father; Substance Abuse in his father.    Social History:  The patient presents with his mom.    reports that he has never smoked. He has never used smokeless tobacco. He reports that he does not drink alcohol and does not use drugs.  PCP: Aicha Peralta     Physical Exam     Patient Vitals for the past 24 hrs:   BP Temp Temp src Pulse Resp SpO2 Weight   03/13/23 1301 (!) 148/80 98.3  F (36.8  C) Temporal 103 18 100 % 59 kg (130 lb)      Physical Exam  General: Alert, appears well-developed and well-nourished. Cooperative.     In mild distress  HEENT:  Head:  Atraumatic  Ears:  External ears are normal  Mouth/Throat:  Oropharynx is without erythema or exudate and mucous membranes are moist.   Eyes:   Conjunctivae normal and EOM are normal. No scleral icterus.    Pupils  are equal, round, and reactive to light.   Neck:   Normal range of motion. Neck supple.  CV:  Normal rate, regular rhythm, normal heart sounds and radial pulses are 2+ and symmetric.  No murmur.  Resp:  Breath sounds are clear bilaterally    Non-labored, no retractions or accessory muscle use  GI:  Abdomen is soft, no distension, no tenderness. No rebound or guarding.  No CVA tenderness bilaterally  MS:  Normal range of motion. No edema.    Normal strength in all 4 extremities.     Back atraumatic.    No midline cervical, thoracic, or lumbar tenderness  Skin:  Warm and dry.  No rash or lesions noted.  Neuro: Alert. Normal strength.  GCS: 15  Psych:  Depressed mood and flat affect.  Endorses suicidal ideation but no active plan.  Here with mother.    Emergency Department Course     Laboratory:  Labs Ordered and Resulted from Time of ED Arrival to Time of ED Departure   COVID-19 VIRUS (CORONAVIRUS) BY PCR - Normal       Result Value    SARS CoV2 PCR Negative          Procedures     Emergency Department Course & Assessments:    Assessments:  1420 I obtained history and examined the patient as noted above.    Independent Interpretation (X-rays, CTs, rhythm strip):  None    Consultations/Discussion of Management or Tests:  None     Social Determinants of Health affecting care:   None    Disposition:  The patient was transferred to [facility].     Impression & Plan    CMS Diagnoses: None    Medical Decision Making:  Elan Rao is a 22 year old male who presents for evaluation of depressed mood and suicidal ideation.  There is a history of depression in the past and they are on medications. There is no signs at this point of a general medical problem causing depression (brain tumor, metabolic derangement like hypothyroidism).  He was referred here by his therapist due to our empath program.    He has been having intermittent thoughts of suicide but is not actively suicidal nor does he actively have a suicidal plan.  He is  here voluntary at this time.  COVID swab is negative and patient is medically cleared at this time.  [facility] to [facility] for definitive psychiatric management and evaluation.      Diagnosis:    ICD-10-CM    1. Depression, unspecified depression type  F32.A       2. Suicidal ideation  R45.851              Scribe Disclosure:  I, Felicia Daigle, am serving as a scribe at 3:21 PM on 3/13/2023 to document services personally performed by Diaz Alvarenga MD based on my observations and the provider's statements to me.     3/13/2023   Diaz Alvarenga MD White, Scott, MD  03/13/23 5700

## 2023-03-13 NOTE — DISCHARGE INSTRUCTIONS
If I am feeling unsafe or I am in a crisis, I will:   Contact my established care providers   Call the National Suicide Prevention Lifeline: 988  Go to the nearest emergency room   Call 911       Warning signs that I or other people might notice when a crisis is developing for me: Temptation to isolate socially, brain fog, when I attempt to be productive and not successfully complete it, tension headaches.     Things I am able to do on my own to cope or help me feel better: Listen to and/or make music.    Things that I am able to do with others to cope or help me better: Work on music, talk, philosophize with my friends.     Things I can use or do for distraction: Watch a favorite movie.    Changes I can make to support my mental health and wellness: Attend newly scheduled medication management, take newly prescribed medication.     People in my life that I can ask for help: My therapist, talk to my mom, reach out to Dad, reach out to Zachary, friends in [place].    Your [facility] has a mental health crisis team you can call 24/7: [facility] Crisis Line Number: [phone]    Other things that are important when I m in crisis: Be supportive, don't try to cheer me up. Acknowledge and validate my current feelings. Do not try to problem solve.    Additional resources and appointment information:     Date: Monday, 3/27/2023  Time: 11:00 am - 12:00 pm  Provider: Kristopher WU  CNP,PMHNP,RN  Location: [address], [place] [address]  Phone: [phone]  Type: Telepsychiatry      Crisis Lines  Crisis Text Line  Text 265826  You will be connected with a trained live crisis counselor to provide support.    Gambling Hotline  1.800.333.hope [4673]    National Hope Line  1.800.SUICIDE [4853934]    National Suicide Prevention Lifeline  Free and confidential support  1.800.639.TALK [8594]  http://suicidepreventionlifeline.org    The Florentin Project (LGBTQ Youth Crisis Line)   "3.244.291.4060  text START to 386-208    [place]'s Crisis Line  1.866.669.2612 (Press 1)  or text 394657    Community Resources  Fast Tracker  Linking people to mental health and substance use disorder resources  [employer]     [place] Mental [facility] Warm Line  Peer to peer support  Monday thru Saturday, 12 pm to 10 pm  164.063.2714 or 4.627.047.2356  Text \"Support\" to 00764    National [place] on Mental Illness (COLLEEN)  246.458.7509 or 1.888.COLLEEN.HELPS    Walk-in Counseling Center  Free mental health counseling  [address] [facility]  509.427.3093    Mental Health Apps  My3  https://AdXpose.org/    VirtualHopeBox  https://[employer]apps/virtual-hope-box/    Suicide Safety Plan ([employer])    Calm Harm      Additional information:  Today you were seen by a licensed mental health professional through Triage and Transition services, Behavioral Healthcare Providers ([facility])  for a crisis assessment in the Emergency Department at [facility].  It is recommended that you follow up with your established providers (psychiatrist, mental health therapist, and/or primary care doctor - as relevant) as soon as possible. Coordinators from [facility] will be calling you in the next 24-48 hours to ensure that you have the resources you need.  You can also contact [facility] coordinators directly at [phone]. You may have been scheduled for or offered an appointment with a mental health provider. [facility] maintains an extensive network of licensed behavioral health providers to connect patients with the services they need.  We do not charge providers a fee to participate in our referral network.  We match patients with providers based on a patient's specific needs, insurance coverage, and location.  Our first effort will be to refer you to a provider within your care system, and will utilize providers outside your care system as needed.      "

## 2023-03-13 NOTE — CONSULTS
"Diagnostic Evaluation Consultation  Crisis Assessment    Patient was assessed: In Person  Patient location: [facility]  Was a release of information signed: Yes. Providers included on the release: Fernanda Rao, Terrence Hawk, Kristopher Hummel      Referral Data and Chief Complaint  Elan is a 22 year old, who uses he/him pronouns, and presents to the ED with family/friends. Patient is referred to the ED by community provider(s). Patient is presenting to the ED for the following concerns: depression.      Informed Consent and Assessment Methods     Patient is his own guardian. Writer met with patient and explained the crisis assessment process, including applicable information disclosures and limits to confidentiality, assessed understanding of the process, and obtained consent to proceed with the assessment. Patient was observed to be able to participate in the assessment as evidenced by acknowledged role of Writer and crisis assessment and responded appropriately to verbal prompts. Assessment methods included conducting a formal interview with patient, review of medical records, collaboration with medical staff, and obtaining relevant collateral information from family and community providers when available.    Over the course of this crisis assessment provided reassurance, offered validation, engaged patient in problem solving and disposition planning and worked with patient on safety and aftercare planning. Patient's response to interventions was calm, cooperative, and engaged.     Summary of Patient Situation     Pt is a 22 year old male with he/him pronouns with a notable history of MDD, MIGUEL A, Bipolar, OCD,Tourette syndrome, and ADHD who presents with his mother to the ED with a chief complaint of depression.  Pt reports \"my depression is getting so bad that I complained that I've lost almost all pleasure in things that I have pleausre, can't pay attention to anything, anhedonia, lost of interest and ability to " "concentrate, focus.\" Pt reports first noticing an increase in his depression when he started a job 3 years ago and working there for 9 months. Pt states the \"depression really started hitting, worse and worse than it was, became more conscious when I was reading, open minded about it, I used to be closed mind, didn't want to participate.\" Pt reports current symptoms of getting too much sleep due to medication, lack of appetite, and lack of interest in pleasurable activities. Pt reports he has also noticed an increase since his best friend passed away unexpectedly 9 months ago and has been grieving his passing. Pt endorses passive suicidal ideation and reports he at times \"romanticizes\" suicide, yet adamantly denies any intent or plan. Pt identifies he does not want to die by self due to \"the biological desire to stay alive\" and the guilt he would have for the impact of his friends and family. Pt reports people at times do not \"understand my philosophy\" and attitudes towards death. Pt denies any HI, AH/VH, or substance abuse. Pt denies any previous suicide attempts or a history of NSSIB. Pt reports presenting to the ED in search of additional resources to address his medications. Pt reports he currently takes his medications as prescribed. Pt reports working with a PCP, psychiatrist, and therapist out in the community. 2020. Too good, take zyprexa, start going off mikayla, numbs me emotionally, make me sleep 11-12 hours, most nights, chronic insomnia, relied on it for it's side effect, ever since I started adderrall, nauseous, have an appetitei for some of the out of the day. Been doing that and it's helped. I was having very suicidal thoughts, no plan, just thoughts/romanticization of it in my head, I talked to the , [facility], I just dont think my philosophy was understood.    Brief Psychosocial History     Pt reports a current living situation of living at home with his mother and younger sister and " "a pet Justyna Bravo. Pt reports family history and extended family history of depression, schizophrenia, and Tourette and substance abuse regarding alcohol. Pt reports working at [employer] in [facility]. Pt reports he is not currently in school. Pt did not disclose any income concerns. Pt denies any  status, relevant legal issues, and cultural, Quaker, or spiritual influences on mental health. Pt identifies as \"very atheist.\"     Significant Clinical History     Pt reports historical mental health diagnoses of MDD, MIGUEL A, Bipolar, OCD, and ADHD. Pt reports being diagnosed with Bipolar and ADHD about 3-4 years ago and \"everything was before I was even 15.\" Pt reports he has been working with therapists and psychiatrists for \"my whole life.\" Pt reports he has been hospitalized in May 2022 for two days at [facility] after his best friend's tragic passing and Pt was experiencing grief surrounding it. Pt denies any commitments, Mejias orders, or other treatment programs. Pt reports he has only completed 1-on-1 talk therapy. Pt reports his best friend's passing and the inpatient mental health services were traumatic for him.     Collateral Information  Writer, Fernanda Serna and Pt met in the family consult room off the [facility] unit for about 30 minutes discussing expectations of the [facility] unit, presenting concerns, current providers, and      Risk Assessment  [place] Suicide Severity Rating Scale Full Clinical Version: 03/13/2023  Suicidal Ideation  1. Wish to be Dead (Lifetime): Yes  1. Wish to be Dead (Past 1 Month): Yes  2. Non-Specific Active Suicidal Thoughts (Lifetime): Yes  2. Non-Specific Active Suicidal Thoughts (Past 1 Month): Yes  3. Active Suicidal Ideation with any Methods (Not Plan) Without Intent to Act (Lifetime): Yes  3. Active Suicidal Ideation with any Methods (Not Plan) Without Intent to Act (Past 1 Month): Yes  4. Active Suicidal Ideation with Some Intent to Act, Without " Specific Plan (Lifetime): No  4. Active Suicidal Ideation with Some Intent to Act, Without Specific Plan (Past 1 Month): No  5. Active Suicidal Ideation with Specific Plan and Intent (Lifetime): No  5. Active Suicidal Ideation with Specific Plan and Intent (Past 1 Month): No  Intensity of Ideation  Most Severe Ideation Rating (Lifetime):  (4.5)  Most Severe Ideation Rating (Past 1 Month): 3  Frequency (Lifetime): Once a week  Frequency (Past 1 Month): Many times each day  Duration (Lifetime): Less than 1 hour/some of the time  Duration (Past 1 Month): 1-4 hours/a lot of time  Controllability (Lifetime): Can control thoughts with little difficulty  Controllability (Past 1 Month): Unable to control thoughts  Deterrents (Lifetime): Deterrents definitely stopped you from attempting suicide  Deterrents (Past 1 Month): Deterrents definitely stopped you from attempting suicide  Reasons for Ideation (Lifetime): Completely to end or stop the pain (You couldn't go on living with the pain or how you were feeling)  Reasons for Ideation (Past 1 Month): Completely to end or stop the pain (You couldn't go on living with the pain or how you were feeling)  Suicidal Behavior  Actual Attempt (Lifetime): No  Has subject engaged in non-suicidal self-injurious behavior? (Lifetime): No  Interrupted Attempts (Lifetime): No  Aborted or Self-Interrupted Attempt (Lifetime): No  Preparatory Acts or Behavior (Lifetime): No  C-SSRS Risk (Lifetime/Recent)  Calculated C-SSRS Risk Score (Lifetime/Recent): Moderate Risk      Validity of evaluation is not impacted by presenting factors during interview.   Comments regarding subjective versus objective responses to [place] tool: NA  Environmental or Psychosocial Events: helplessness/hopelessness, unemployment/underemployment and neither working nor attending school  Chronic Risk Factors: history of psychiatric hospitalization   Warning Signs: talking or writing about death, dying, or suicide,  withdrawing from friends, family, and society and anxiety, agitation, unable to sleep, sleeping all the time  Protective Factors: strong bond to family unit, community support, or employment, responsibilities and duties to others, including pets and children, lives in a responsibly safe and stable environment, good treatment engagement, sense of importance of health and wellness, able to access care without barriers, supportive ongoing medical and mental health care relationships, help seeking, good problem-solving, coping, and conflict resolution skills and optimistic outlook - identification of future goals  Interpretation of Risk Scoring, Risk Mitigation Interventions and Safety Plan:  After mental health crisis assessment and aftercare planning by [facility] care team and consultation with attending provider, the patient's circumstances and mental state were safe for outpatient management. Patient denies any mental health or safety concerns at this time. Close follow-up with newly established psychiatrist and current therapist was recommended and community TMS/ECT resources were provided. Patient is to return to the ED if any urgent or potentially life-threatening concerns arise.        At the time of discharge, the patient's acute suicide risk was determined to be low due to the following factors: reduction in the intensity of mood/anxiety symptoms that preceded the admission, decreased of suicidal thoughts, denial of suicide plan or intent, denies feeling helpless or hopeless, not currently under the influence of alcohol or illicit substances, denies experiencing command hallucinations and no immediate access to firearms. Protective factors include: social support, displays resiliency , future focused thinking, displays insight, help seeking, and safe/stable housing.     Does the patient have thoughts of harming others? No     Is the patient engaging in sexually inappropriate behavior?  no        Current Substance  Abuse     Is there recent substance abuse? no - Pt reports consuming 2-3 beers a week. Pt reports he has not consumed marijuana for the past 2 months. Pt reports he has been prescribed medical marijuana for the past 3 years and believes he was abusing it.      Was a urine drug screen or blood alcohol level obtained: No       Mental Status Exam     Affect: Flat   Appearance: Appropriate    Attention Span/Concentration: Attentive  Eye Contact: Engaged   Fund of Knowledge: Appropriate    Language /Speech Content: Fluent   Language /Speech Volume: Normal    Language /Speech Rate/Productions: Normal    Recent Memory: Intact   Remote Memory: Intact   Mood: Anxious and Depressed    Orientation to Person: Yes    Orientation to Place: Yes   Orientation to Time of Day: Yes    Orientation to Date: Yes    Situation (Do they understand why they are here?): Yes    Psychomotor Behavior: Normal    Thought Content: Clear, Suicidal and Other: suicidal ideation is passive.   Thought Form: Intact      History of commitment: No       Medication    Psychotropic medications: Yes. Pt is currently taking see below. Medication compliant: Yes. Recent medication changes: Yes Pt reports he was prescribed Wellbutrin and is weaning off Zyprexa from his appointment on Friday, 3/10/2023  Medication changes made in the last two weeks: Yes - Pt reports he was prescribed Wellbutrin on Friday and is starting to wean off Zyprexa on Friday.       Current Care Team    Primary Care Provider: Yes. Name: Manuelito Delacruz MD. Location: [facility]. Date of last visit: 11/09/2021. Frequency: see chart. Perceived helpfulness: yes.  Psychiatrist: Yes. Name: Ramiro German. Location: [facility]. Date of last visit: 03/10/2023. Frequency: every two weeks. Perceived helpfulness: na.   Therapist: Yes. Name: Terrence Hawk. Location: [facility]. Date of last visit: 03/08/2023. Frequency: twice a week. Perceived helpfulness: yes.  "  : No      CTSS or ARM: No   ACT Team: No   Other: No       Diagnosis    311 (F32.9) Unspecified Depressive Disorder  - primary and - by history   300.00 (F41.9) Unspecified Anxiety Disorder - by history   Other Unspecified and Specified Bipolar and Related Disorder 296.80 (F31.9) Unspecified Bipolar and Related Disorder - by history   300.3 (F42) Obsessive Compulsive Disorder - by history   Attention-Deficit/Hyperactivity Disorder  314.01 (F90.9) Unspecified Attention -Deficit / Hyperactivity Disorder - by history       Clinical Summary and Substantiation of Recommendations        Disposition    Recommended disposition: Individual Therapy and Medication Management       Reviewed case and recommendations with attending provider. Attending Name: Caitlin Hirsch NP       Attending concurs with disposition: Yes       Patient and/or validated legal guardian concurs with disposition: Yes       Final disposition: Individual therapy  and Medication management.     Inpatient Details (if applicable): NA    Outpatient Details (if applicable):   Aftercare plan and appointments placed in the AVS and provided to patient: Yes. Given to patient by [facility] RN upon discharge.    Was lethal means counseling provided as a part of aftercare planning? Yes - describe he reports there are no guns in the house because they are \"very anti-gun.\"       Assessment Details    Patient interview started at: 1638 and completed at: 1701.     Total duration spent on the patient case in minutes: 1.50 hrs      CPT code(s) utilized: 60377 - Psychotherapy for Crisis - 60 (30-74*) min       Jose Kowalski [facility], Psychotherapist  [facility] - Triage & Transition Services  Callback: [phone]      If I am feeling unsafe or I am in a crisis, I will:   Contact my established care providers   Call the National Suicide Prevention Lifeline: 988  Go to the nearest emergency room   Call 139         Warning signs that I or other people might notice when a " crisis is developing for me: Temptation to isolate socially, brain fog, when I attempt to be productive and not successfully complete it, tension headaches.      Things I am able to do on my own to cope or help me feel better: Listen to and/or make music.     Things that I am able to do with others to cope or help me better: Work on music, talk, philosophize with my friends.      Things I can use or do for distraction: Watch a favorite movie.     Changes I can make to support my mental health and wellness: Attend newly scheduled medication management, take newly prescribed medication.      People in my life that I can ask for help: My therapist, talk to my mom, reach out to Dad, reach out to Zachary, friends in [place].     Your [facility] has a mental health crisis team you can call 24/7: [facility] Crisis Line Number: [phone]     Other things that are important when I m in crisis: Be supportive, don't try to cheer me up. Acknowledge and validate my current feelings. Do not try to problem solve.     Additional resources and appointment information:      Date: Monday, 3/27/2023  Time: 11:00 am - 12:00 pm  Provider: Kristopher Hummel  MSN  CNP,PMHNP,RN  Location: [facility], [address] [place] [address] [address], [place] [address]  Phone: [phone]  Type: Telepsychiatry        Crisis Lines  Crisis Text Line  Text 386891  You will be connected with a trained live crisis counselor to provide support.     Gambling Hotline  1.800333.hope [4673]     National Hope Line  1.800.SUICIDE [8911245]     National Suicide Prevention Lifeline  Free and confidential support  1.800.003.TALK [6366]  http://suicidepreventionlifeline.org     The Florentin Project (LGBTQ Youth Crisis Line)  3.037.291.9020  text START to 298-032     's Crisis Line  5.461.619.1500 (Press 1)  or text 130826     Community Resources  Fast Tracker  Linking people to mental health and substance use disorder resources  Spongecelln.org  "     [facility][note 2] Warm Line  Peer to peer support  Monday thru Saturday, 12 pm to 10 pm  298.065.1691 or 4.903.642.9656  Text \"Support\" to 68868     National [place] on Mental Illness (COLLEEN)  861.900.8151 or 1.888.COLLEEN.HELPS     Walk-in Counseling Center  Free mental health counseling  [facility] [facility]  102.268.9932     Mental Health Apps  My3  https://TheySay.BrightView Systems/     VirtualHopeBox  https://Iceberg/apps/virtual-hope-box/     Suicide Safety Plan ([employer])     Calm Harm        Additional information:  Today you were seen by a licensed mental health professional through Triage and Transition services, Behavioral Healthcare Providers ([facility])  for a crisis assessment in the Emergency Department at [facility].  It is recommended that you follow up with your established providers (psychiatrist, mental health therapist, and/or primary care doctor - as relevant) as soon as possible. Coordinators from [facility] will be calling you in the next 24-48 hours to ensure that you have the resources you need.  You can also contact [facility] coordinators directly at [phone]. You may have been scheduled for or offered an appointment with a mental health provider. [facility] maintains an extensive network of licensed behavioral health providers to connect patients with the services they need.  We do not charge providers a fee to participate in our referral network.  We match patients with providers based on a patient's specific needs, insurance coverage, and location.  Our first effort will be to refer you to a provider within your care system, and will utilize providers outside your care system as needed.              "

## 2023-03-14 NOTE — ED NOTES
22 year old male received from ED due to increased depression and passive SI. Denies intent or plan. Therapist recommended patient to come in and be assessed at [facility].     Nursing and risk assessments completed. Assessments reviewed with LMHP and physician. Admission information reviewed with patient. Patient given a tour of [facility] and instructions on using the facility. Questions regarding [facility] addressed. Pt safety search completed.

## 2023-03-14 NOTE — ED NOTES
Patient agreeble to discharge plan. Discharge instructions reviewed with patient including follow-up care plan. Medications: Effexor prescribed to patient's preferred outpatient pharmacy. Reviewed safety plan and outpatient resources. Denies SI and HI. All belongings that were brought into the hospital have been returned to patient. Escorted off the unit at 1910 accompanied by [facility] staff. Discharged to home via ride from mom.

## 2023-04-23 ENCOUNTER — HEALTH MAINTENANCE LETTER (OUTPATIENT)
Age: 23
End: 2023-04-23

## 2023-07-27 ENCOUNTER — OFFICE VISIT (OUTPATIENT)
Dept: URGENT CARE | Facility: URGENT CARE | Age: 23
End: 2023-07-27
Payer: COMMERCIAL

## 2023-07-27 VITALS
BODY MASS INDEX: 22.37 KG/M2 | SYSTOLIC BLOOD PRESSURE: 119 MMHG | DIASTOLIC BLOOD PRESSURE: 85 MMHG | OXYGEN SATURATION: 95 % | RESPIRATION RATE: 16 BRPM | HEART RATE: 110 BPM | WEIGHT: 144.2 LBS | TEMPERATURE: 97.4 F

## 2023-07-27 DIAGNOSIS — R07.0 THROAT PAIN: Primary | ICD-10-CM

## 2023-07-27 LAB
DEPRECATED S PYO AG THROAT QL EIA: NEGATIVE
GROUP A STREP BY PCR: NOT DETECTED

## 2023-07-27 PROCEDURE — 87651 STREP A DNA AMP PROBE: CPT

## 2023-07-27 PROCEDURE — 99213 OFFICE O/P EST LOW 20 MIN: CPT

## 2023-07-27 RX ORDER — QUETIAPINE FUMARATE 50 MG/1
TABLET, FILM COATED ORAL
COMMUNITY
Start: 2023-07-19

## 2023-07-27 RX ORDER — BUPROPION HYDROCHLORIDE 300 MG/1
300 TABLET ORAL EVERY MORNING
COMMUNITY
Start: 2023-05-17

## 2023-07-27 ASSESSMENT — PAIN SCALES - GENERAL: PAINLEVEL: SEVERE PAIN (7)

## 2023-07-27 NOTE — PATIENT INSTRUCTIONS
Strep test is negative for strep throat. Get plenty of rest and drink fluids.  Can use Tylenol as needed for pain.  Maximum dose of Tylenol is 4000mg in a 24 hour period of time.  You can also try warm salt water gargles, hot/warm water or tea with honey and/or lemon and/or Cepacol lozenges or spray for your sore throat.

## 2023-07-27 NOTE — PROGRESS NOTES
ASSESSMENT:   (R07.0) Throat pain  (primary encounter diagnosis)  Plan: Streptococcus A Rapid Screen w/Reflex to PCR -         Clinic Collect    PLAN:  Informed the patient that the strep test is negative for strep throat.  We discussed the need to get plenty of rest, drink fluids and use Tylenol as needed for pain and fever with a maximum dose of Tylenol being 4000 mg in a 24-hour period of time.  We also discussed trying warm salt water gargles, hot/warm water or tea with honey and/or lemon and/or Cepacol lozenges or spray for the sore throat.  Discussed the need to return to clinic with any new or worsening symptoms.  Patient acknowledged their understanding of the above plan.    The use of Dragon/Acsisation services may have been used to construct the content in this note; any grammatical or spelling errors are non-intentional. Please contact the author of this note directly if you are in need of any clarification.      Shubham Sands, CINDY CNP      SUBJECTIVE:   Elan Rao  is a 23 year old male who is here today because of: Sore Throat.  The patient has had symptoms of nasal congestion/runny nose, fatigue, and fever/chills.   Onset of symptoms was 2 days ago. Course of illness is worsening.  Patient admits to exposure to illness while bar hopping last weekend.   Patient denies cough, earache, vomiting, and diarrhea  Treatment measures tried include Vitamin C packets and NyQuil.    The patient indicates he did not do an at home COVID test.     ROS:  Negative except noted above.      OBJECTIVE:   /85 (BP Location: Left arm, Patient Position: Sitting, Cuff Size: Adult Regular)   Pulse 110   Temp 97.4  F (36.3  C) (Tympanic)   Resp 16   Wt 65.4 kg (144 lb 3.2 oz)   SpO2 95%   BMI 22.37 kg/m    General: healthy, alert and no distress  Eyes - conjunctivae clear.  Ears - External ears normal. Canals clear. TM's normal.  Nose/Sinuses - Nares normal.Mucosa normal. No drainage or sinus  tenderness.  Oropharynx - Lips, mucosa, tonsils, oropharynx and tongue normal  Neck - Neck supple; no lymphadenopathy  Lungs - Lungs clear; no wheezing or rales.  Heart - regular rate and rhythm. No murmurs, rub.    Labs:  Rapid Strep test is negative; await throat culture results.

## 2023-08-01 ENCOUNTER — OFFICE VISIT (OUTPATIENT)
Dept: FAMILY MEDICINE | Facility: CLINIC | Age: 23
End: 2023-08-01
Payer: COMMERCIAL

## 2023-08-01 VITALS
BODY MASS INDEX: 22.22 KG/M2 | HEIGHT: 67 IN | DIASTOLIC BLOOD PRESSURE: 87 MMHG | SYSTOLIC BLOOD PRESSURE: 124 MMHG | HEART RATE: 118 BPM | OXYGEN SATURATION: 99 % | WEIGHT: 141.6 LBS | RESPIRATION RATE: 16 BRPM

## 2023-08-01 DIAGNOSIS — F32.1 MAJOR DEPRESSIVE DISORDER, SINGLE EPISODE, MODERATE (H): ICD-10-CM

## 2023-08-01 DIAGNOSIS — R00.0 TACHYCARDIA: ICD-10-CM

## 2023-08-01 DIAGNOSIS — F95.8 MOTOR TIC DISORDER: ICD-10-CM

## 2023-08-01 DIAGNOSIS — Z00.00 ROUTINE HISTORY AND PHYSICAL EXAMINATION OF ADULT: Primary | ICD-10-CM

## 2023-08-01 DIAGNOSIS — F95.2 TOURETTE DISORDER: ICD-10-CM

## 2023-08-01 LAB
ALBUMIN SERPL BCG-MCNC: 5 G/DL (ref 3.5–5.2)
ALP SERPL-CCNC: 69 U/L (ref 40–129)
ALT SERPL W P-5'-P-CCNC: 13 U/L (ref 0–70)
ANION GAP SERPL CALCULATED.3IONS-SCNC: 13 MMOL/L (ref 7–15)
AST SERPL W P-5'-P-CCNC: 23 U/L (ref 0–45)
BASOPHILS # BLD AUTO: 0 10E3/UL (ref 0–0.2)
BASOPHILS NFR BLD AUTO: 0 %
BILIRUB SERPL-MCNC: 0.3 MG/DL
BUN SERPL-MCNC: 7.4 MG/DL (ref 6–20)
CALCIUM SERPL-MCNC: 9.9 MG/DL (ref 8.6–10)
CHLORIDE SERPL-SCNC: 104 MMOL/L (ref 98–107)
CHOLEST SERPL-MCNC: 114 MG/DL
CREAT SERPL-MCNC: 0.81 MG/DL (ref 0.67–1.17)
DEPRECATED HCO3 PLAS-SCNC: 23 MMOL/L (ref 22–29)
EOSINOPHIL # BLD AUTO: 0.1 10E3/UL (ref 0–0.7)
EOSINOPHIL NFR BLD AUTO: 1 %
ERYTHROCYTE [DISTWIDTH] IN BLOOD BY AUTOMATED COUNT: 11.9 % (ref 10–15)
GFR SERPL CREATININE-BSD FRML MDRD: >90 ML/MIN/1.73M2
GLUCOSE SERPL-MCNC: 90 MG/DL (ref 70–99)
HCT VFR BLD AUTO: 47.5 % (ref 40–53)
HDLC SERPL-MCNC: 27 MG/DL
HGB BLD-MCNC: 16.5 G/DL (ref 13.3–17.7)
IMM GRANULOCYTES # BLD: 0.1 10E3/UL
IMM GRANULOCYTES NFR BLD: 1 %
LDLC SERPL CALC-MCNC: 59 MG/DL
LYMPHOCYTES # BLD AUTO: 2.6 10E3/UL (ref 0.8–5.3)
LYMPHOCYTES NFR BLD AUTO: 30 %
MCH RBC QN AUTO: 28.6 PG (ref 26.5–33)
MCHC RBC AUTO-ENTMCNC: 34.7 G/DL (ref 31.5–36.5)
MCV RBC AUTO: 82 FL (ref 78–100)
MONOCYTES # BLD AUTO: 0.6 10E3/UL (ref 0–1.3)
MONOCYTES NFR BLD AUTO: 7 %
NEUTROPHILS # BLD AUTO: 5.3 10E3/UL (ref 1.6–8.3)
NEUTROPHILS NFR BLD AUTO: 61 %
NONHDLC SERPL-MCNC: 87 MG/DL
PLATELET # BLD AUTO: 299 10E3/UL (ref 150–450)
POTASSIUM SERPL-SCNC: 4.4 MMOL/L (ref 3.4–5.3)
PROT SERPL-MCNC: 8 G/DL (ref 6.4–8.3)
RBC # BLD AUTO: 5.77 10E6/UL (ref 4.4–5.9)
SODIUM SERPL-SCNC: 140 MMOL/L (ref 136–145)
TRIGL SERPL-MCNC: 141 MG/DL
WBC # BLD AUTO: 8.8 10E3/UL (ref 4–11)

## 2023-08-01 PROCEDURE — 99395 PREV VISIT EST AGE 18-39: CPT | Performed by: FAMILY MEDICINE

## 2023-08-01 PROCEDURE — 85025 COMPLETE CBC W/AUTO DIFF WBC: CPT | Performed by: FAMILY MEDICINE

## 2023-08-01 PROCEDURE — 36415 COLL VENOUS BLD VENIPUNCTURE: CPT | Performed by: FAMILY MEDICINE

## 2023-08-01 PROCEDURE — 80053 COMPREHEN METABOLIC PANEL: CPT | Performed by: FAMILY MEDICINE

## 2023-08-01 PROCEDURE — 93000 ELECTROCARDIOGRAM COMPLETE: CPT | Performed by: FAMILY MEDICINE

## 2023-08-01 PROCEDURE — 99213 OFFICE O/P EST LOW 20 MIN: CPT | Mod: 25 | Performed by: FAMILY MEDICINE

## 2023-08-01 PROCEDURE — 80061 LIPID PANEL: CPT | Performed by: FAMILY MEDICINE

## 2023-08-01 RX ORDER — PROPRANOLOL HYDROCHLORIDE 20 MG/1
20 TABLET ORAL 2 TIMES DAILY
Qty: 60 TABLET | Refills: 0 | Status: SHIPPED | OUTPATIENT
Start: 2023-08-01

## 2023-08-01 ASSESSMENT — ENCOUNTER SYMPTOMS
FEVER: 0
MYALGIAS: 0
COUGH: 0
SORE THROAT: 0
SHORTNESS OF BREATH: 1
HEARTBURN: 0
PALPITATIONS: 0
NAUSEA: 1
FREQUENCY: 0
DIARRHEA: 0
DYSURIA: 0
HEADACHES: 0
JOINT SWELLING: 0
CHILLS: 0
WEAKNESS: 0
HEMATOCHEZIA: 0
ABDOMINAL PAIN: 0
HEMATURIA: 0
NERVOUS/ANXIOUS: 0
DIZZINESS: 0
CONSTIPATION: 0
EYE PAIN: 0
PARESTHESIAS: 0
ARTHRALGIAS: 0

## 2023-08-01 ASSESSMENT — PATIENT HEALTH QUESTIONNAIRE - PHQ9
10. IF YOU CHECKED OFF ANY PROBLEMS, HOW DIFFICULT HAVE THESE PROBLEMS MADE IT FOR YOU TO DO YOUR WORK, TAKE CARE OF THINGS AT HOME, OR GET ALONG WITH OTHER PEOPLE: VERY DIFFICULT
SUM OF ALL RESPONSES TO PHQ QUESTIONS 1-9: 17
SUM OF ALL RESPONSES TO PHQ QUESTIONS 1-9: 17

## 2023-08-01 ASSESSMENT — PAIN SCALES - GENERAL: PAINLEVEL: NO PAIN (0)

## 2023-08-01 NOTE — PROGRESS NOTES
{PROVIDER CHARTING PREFERENCE:116108}    Kemi Ansari is a 23 year old, presenting for the following health issues:  Physical      8/1/2023     2:41 PM   Additional Questions   Roomed by Jackie PEACE   Accompanied by self   Patient states he is currently withdrawing from his medication and is very shaky. He states he is having a concern about his high pulse rate     Healthy Habits:     Getting at least 3 servings of Calcium per day:  Yes    Bi-annual eye exam:  NO    Dental care twice a year:  NO    Sleep apnea or symptoms of sleep apnea:  None    Diet:  Regular (no restrictions)    Frequency of exercise:  None    Taking medications regularly:  Yes    Medication side effects:  None    Additional concerns today:  No     {ALERT  Recent PHQ-9 score indicates suicidal ideations :404872}{Provider Documentation  Link to C-SSRS ([facility]) Flowsheet :243204}  {SUPERLIST (Optional):591534}  {additonal problems for provider to add (Optional):679569}      Review of Systems   Constitutional:  Negative for chills and fever.   HENT:  Negative for congestion, ear pain, hearing loss and sore throat.    Eyes:  Negative for pain and visual disturbance.   Respiratory:  Positive for shortness of breath. Negative for cough.    Cardiovascular:  Negative for chest pain, palpitations and peripheral edema.   Gastrointestinal:  Positive for nausea. Negative for abdominal pain, constipation, diarrhea, heartburn and hematochezia.   Genitourinary:  Negative for dysuria, frequency, genital sores, hematuria, impotence, penile discharge and urgency.   Musculoskeletal:  Negative for arthralgias, joint swelling and myalgias.   Skin:  Negative for rash.   Neurological:  Negative for dizziness, weakness, headaches and paresthesias.   Psychiatric/Behavioral:  Positive for mood changes. The patient is not nervous/anxious.       {ROS COMP (Optional):237683}      Objective    /87 (BP Location: Right arm, Cuff Size: Adult Regular)    "Pulse 118   Resp 16   Ht 1.71 m (5' 7.32\")   Wt 64.2 kg (141 lb 9.6 oz)   SpO2 99%   BMI 21.97 kg/m    Body mass index is 21.97 kg/m .  Physical Exam   {Exam List (Optional):930551}    {Diagnostic Test Results (Optional):324582}    {AMBULATORY ATTESTATION (Optional):537287}            "

## 2023-08-01 NOTE — PROGRESS NOTES
SUBJECTIVE:   CC: Elan is an 23 year old who presents for preventative health visit.       8/1/2023     2:41 PM   Additional Questions   Roomed by Jackie PEACE   Accompanied by self       Healthy Habits:     Getting at least 3 servings of Calcium per day:  Yes    Bi-annual eye exam:  NO    Dental care twice a year:  NO    Sleep apnea or symptoms of sleep apnea:  None    Diet:  Regular (no restrictions)    Frequency of exercise:  None    Taking medications regularly:  Yes    Medication side effects:  None    Additional concerns today:  No    Patient states he is currently withdrawing from his medication and is very shaky. He states he is having a concern about his high pulse rate      Tachycardia x 6 months    Pulse 118 and SOB    Gets SOB with exertion    Gets panic attacks    On Adderall, drinks a lot of coffee    Has ADHD and Depression    Meds:  1. Adderall XR 30 and IR30  2. Wellbutrin 300 mg  3. Valium 5 mg tds  Lamotrigine: 200 mg juan miguel  Remeron 15  Zyprexa 5  Propranolol 10  Effexor 37.5    Quit marijuana use in 1/2023, never smoked tobacco, drinks very occasional (a beer)  Current Medications:   On a lot of medications.   [facility]      Today's PHQ-9 Score:       8/1/2023     2:36 PM   PHQ-9 SCORE   PHQ-9 Total Score MyChart 17 (Moderately severe depression)   PHQ-9 Total Score 17         10/5/2021     6:02 PM 2/21/2022    11:06 AM 8/1/2023     2:36 PM   PHQ   PHQ-9 Total Score 24 27 17   Q9: Thoughts of better off dead/self-harm past 2 weeks More than half the days Nearly every day Several days   F/U: Thoughts of suicide or self-harm  Yes No   F/U: Self harm-plan  No    F/U: Self-harm action  No    F/U: Safety concerns  No No      Patient under the care of mental health; at this time confident will not harm himself; has follow up with mental health tomorrow.    Social History     Tobacco Use     Smoking status: Never     Passive exposure: Never     Smokeless tobacco: Never     Tobacco comments:      no exposure   Substance Use Topics     Alcohol use: No           8/1/2023     2:38 PM   Alcohol Use   Prescreen: >3 drinks/day or >7 drinks/week? No          No data to display                Last PSA: No results found for: PSA    Reviewed orders with patient. Reviewed health maintenance and updated orders accordingly - Yes  Patient Active Problem List   Diagnosis     Tourette syndrome     Periodic headache syndrome, not intractable     Major depressive disorder, recurrent episode, in partial remission (H)     Insomnia due to other mental disorder     Disturbance of salivary secretion     Generalized anxiety disorder     Past Surgical History:   Procedure Laterality Date     NO HISTORY OF SURGERY         Social History     Tobacco Use     Smoking status: Never     Passive exposure: Never     Smokeless tobacco: Never     Tobacco comments:     no exposure   Substance Use Topics     Alcohol use: No     Family History   Problem Relation Age of Onset     Asthma Mother      Substance Abuse Father      Depression Father      Schizophrenia Father            Reviewed and updated as needed this visit by clinical staff   Tobacco  Allergies  Meds              Reviewed and updated as needed this visit by Provider                     Review of Systems   Constitutional:  Negative for chills and fever.   HENT:  Negative for congestion, ear pain, hearing loss and sore throat.    Eyes:  Negative for pain and visual disturbance.   Respiratory:  Positive for shortness of breath. Negative for cough.    Cardiovascular:  Negative for chest pain, palpitations and peripheral edema.   Gastrointestinal:  Positive for nausea. Negative for abdominal pain, constipation, diarrhea, heartburn and hematochezia.   Genitourinary:  Negative for dysuria, frequency, genital sores, hematuria, impotence, penile discharge and urgency.   Musculoskeletal:  Negative for arthralgias, joint swelling and myalgias.   Skin:  Negative for rash.  "  Neurological:  Negative for dizziness, weakness, headaches and paresthesias.   Psychiatric/Behavioral:  Positive for mood changes. The patient is not nervous/anxious.      CONSTITUTIONAL: NEGATIVE for fever, chills, change in weight  INTEGUMENTARY/SKIN: NEGATIVE for worrisome rashes, moles or lesions  EYES: NEGATIVE for vision changes or irritation  ENT: NEGATIVE for ear, mouth and throat problems  RESP: NEGATIVE for significant cough or SOB  CV: NEGATIVE for chest pain, palpitations or peripheral edema  GI: NEGATIVE for nausea, abdominal pain, heartburn, or change in bowel habits   male: negative for dysuria, hematuria, decreased urinary stream, erectile dysfunction, urethral discharge  MUSCULOSKELETAL: NEGATIVE for significant arthralgias or myalgia  NEURO: NEGATIVE for weakness, dizziness or paresthesias  PSYCHIATRIC: NEGATIVE for changes in mood or affect    OBJECTIVE:   /87 (BP Location: Right arm, Cuff Size: Adult Regular)   Pulse 118   Resp 16   Ht 1.71 m (5' 7.32\")   Wt 64.2 kg (141 lb 9.6 oz)   SpO2 99%   BMI 21.97 kg/m      Physical Exam  GENERAL: healthy, alert and no distress  EYES: Eyes grossly normal to inspection, PERRL and conjunctivae and sclerae normal  HENT: ear canals and TM's normal, nose and mouth without ulcers or lesions  NECK: no adenopathy, no asymmetry, masses, or scars and thyroid normal to palpation  RESP: lungs clear to auscultation - no rales, rhonchi or wheezes  CV: regular rate and rhythm, normal S1 S2, no S3 or S4, no murmur, click or rub, no peripheral edema and peripheral pulses strong  ABDOMEN: soft, nontender, no hepatosplenomegaly, no masses and bowel sounds normal  MS: no gross musculoskeletal defects noted, no edema  SKIN: no suspicious lesions or rashes  NEURO: Normal strength and tone, mentation intact and speech normal  PSYCH: mentation appears normal, affect normal/bright    Diagnostic Test Results:  Labs reviewed in Epic    ASSESSMENT/PLAN:   Elan was " seen today for physical.    Diagnoses and all orders for this visit:    Routine history and physical examination of adult  -     Comprehensive metabolic panel (BMP + Alb, Alk Phos, ALT, AST, Total. Bili, TP); Future  -     CBC with platelets and differential; Future  -     Lipid Profile (Chol, Trig, HDL, LDL calc); Future  -     Comprehensive metabolic panel (BMP + Alb, Alk Phos, ALT, AST, Total. Bili, TP)  -     CBC with platelets and differential  -     Lipid Profile (Chol, Trig, HDL, LDL calc)    Tourette disorder    Motor tic disorder      -  Tourrette disorder related.    Tachycardia  -     EKG 12-lead complete w/read - Clinics  -     propranolol (INDERAL) 20 MG tablet; Take 1 tablet (20 mg) by mouth 2 times daily    Major depressive disorder, single episode, moderate (H)      -   Managed by mental health, with close follow up and medications.      Patient has been advised of split billing requirements and indicates understanding: Yes    Depression Screening Follow Up        8/1/2023     2:36 PM   PHQ   PHQ-9 Total Score 17   Q9: Thoughts of better off dead/self-harm past 2 weeks Several days   F/U: Thoughts of suicide or self-harm No   F/U: Safety concerns No         8/1/2023     2:36 PM   Last PHQ-9   1.  Little interest or pleasure in doing things 2   2.  Feeling down, depressed, or hopeless 2   3.  Trouble falling or staying asleep, or sleeping too much 2   4.  Feeling tired or having little energy 1   5.  Poor appetite or overeating 3   6.  Feeling bad about yourself 3   7.  Trouble concentrating 2   8.  Moving slowly or restless 1   Q9: Thoughts of better off dead/self-harm past 2 weeks 1   PHQ-9 Total Score 17   In the past two weeks have you had thoughts of suicide or self harm? No   Do you have concerns about your personal safety or the safety of others? No             8/3/2023     9:08 AM   C-SSRS ([facility] [place])   Within the last month, have you wished you were dead or wished you could go to sleep  and not wake up? Yes   Within the last month, have you had any actual thoughts of killing yourself? No   Within the last month, have you ever done anything, started to do anything, or prepared to do anything to end your life? No     Follow Up     Follow Up Actions Taken  Crisis resource information provided in the After Visit Summary  Referred patient back to mental health provider    Discussed the following ways the patient can remain in a safe environment:  be around others    COUNSELING:   Reviewed preventive health counseling, as reflected in patient instructions       Regular exercise       Healthy diet/nutrition    He reports that he has never smoked. He has never been exposed to tobacco smoke. He has never used smokeless tobacco.      {Counseling Resources  [place] Preventive Services Task Force  Cholesterol Screening  Health diet/nutrition  Pooled Cohorts Equation Calculator  [employer] MyPlate  ASA Prophylaxis  Lung CA Screening  Osteoporosis prevention/bone health :  {Prostate Cancer Screening  Consider for men 55-69 per guidance from USPSTF     Manuelito Delacruz MD  [facility] submitted by the patient for this visit:  Patient Health Questionnaire (Submitted on 8/1/2023)  If you checked off any problems, how difficult have these problems made it for you to do your work, take care of things at home, or get along with other people?: Very difficult  PHQ9 TOTAL SCORE: 17

## 2023-08-03 ASSESSMENT — COLUMBIA-SUICIDE SEVERITY RATING SCALE - C-SSRS
6. HAVE YOU EVER DONE ANYTHING, STARTED TO DO ANYTHING, OR PREPARED TO DO ANYTHING TO END YOUR LIFE?: NO
2. IN THE PAST MONTH, HAVE YOU ACTUALLY HAD ANY THOUGHTS OF KILLING YOURSELF?: NO
6. HAVE YOU EVER DONE ANYTHING, STARTED TO DO ANYTHING, OR PREPARED TO DO ANYTHING TO END YOUR LIFE?: NO
1. WITHIN THE PAST MONTH, HAVE YOU WISHED YOU WERE DEAD OR WISHED YOU COULD GO TO SLEEP AND NOT WAKE UP?: YES
1. WITHIN THE PAST MONTH, HAVE YOU WISHED YOU WERE DEAD OR WISHED YOU COULD GO TO SLEEP AND NOT WAKE UP?: YES
2. IN THE PAST MONTH, HAVE YOU ACTUALLY HAD ANY THOUGHTS OF KILLING YOURSELF?: NO

## 2024-02-23 ENCOUNTER — OFFICE VISIT (OUTPATIENT)
Dept: FAMILY MEDICINE | Facility: CLINIC | Age: 24
End: 2024-02-23
Payer: COMMERCIAL

## 2024-02-23 VITALS
HEIGHT: 68 IN | HEART RATE: 93 BPM | OXYGEN SATURATION: 100 % | DIASTOLIC BLOOD PRESSURE: 67 MMHG | BODY MASS INDEX: 20.61 KG/M2 | WEIGHT: 136 LBS | RESPIRATION RATE: 15 BRPM | SYSTOLIC BLOOD PRESSURE: 101 MMHG

## 2024-02-23 DIAGNOSIS — F41.9 ANXIETY: ICD-10-CM

## 2024-02-23 DIAGNOSIS — F33.2 SEVERE EPISODE OF RECURRENT MAJOR DEPRESSIVE DISORDER, WITHOUT PSYCHOTIC FEATURES (H): ICD-10-CM

## 2024-02-23 DIAGNOSIS — G47.00 PERSISTENT INSOMNIA: Primary | ICD-10-CM

## 2024-02-23 DIAGNOSIS — Z11.59 NEED FOR HEPATITIS C SCREENING TEST: ICD-10-CM

## 2024-02-23 DIAGNOSIS — F90.0 ATTENTION DEFICIT HYPERACTIVITY DISORDER (ADHD), PREDOMINANTLY INATTENTIVE TYPE: ICD-10-CM

## 2024-02-23 PROCEDURE — 99213 OFFICE O/P EST LOW 20 MIN: CPT | Performed by: FAMILY MEDICINE

## 2024-02-23 RX ORDER — LISDEXAMFETAMINE DIMESYLATE 30 MG/1
1 CAPSULE ORAL AT BEDTIME
COMMUNITY
Start: 2024-01-02

## 2024-02-23 ASSESSMENT — PAIN SCALES - GENERAL: PAINLEVEL: NO PAIN (0)

## 2024-02-23 ASSESSMENT — PATIENT HEALTH QUESTIONNAIRE - PHQ9
10. IF YOU CHECKED OFF ANY PROBLEMS, HOW DIFFICULT HAVE THESE PROBLEMS MADE IT FOR YOU TO DO YOUR WORK, TAKE CARE OF THINGS AT HOME, OR GET ALONG WITH OTHER PEOPLE: EXTREMELY DIFFICULT
SUM OF ALL RESPONSES TO PHQ QUESTIONS 1-9: 21
SUM OF ALL RESPONSES TO PHQ QUESTIONS 1-9: 21

## 2024-02-23 NOTE — PROGRESS NOTES
Assessment & Plan     Persistent insomnia    Discussed evaluation by sleep specialist; tried several medications without success  - Adult Sleep Eval & Management  Referral    Severe episode of recurrent major depressive disorder, without psychotic features (H)   Following with mental health  - Adult Sleep Eval & Management  Referral    Anxiety  - Adult Sleep Eval & Management  Referral    Attention deficit hyperactivity disorder (ADHD), predominantly inattentive type      Stimulant medication might also be contributing to his sleep issues  - Adult Sleep Eval & Management  Referral    Need for hepatitis C screening test   -  will check at physical      See Patient Instructions    Kemi Ansari is a 23 year old, presenting for the following health issues:  Sleep Problem x over 3 months      Patient reports that needs excessive amount of sleep to feel rested.  Hard to fall asleep: tried multiple medications  Evens when sleeps he repeatedly wakes up.    Has hx of tourette and periodic headaches  Has history of depression with suicidal ideation.  Meds: Wellbutrin, Vyvanse, Mirtazepine, Effexoor and Melatonin   Does not smoke or drink.   Does drink caffeine throughout the day      2/23/2024     2:17 PM   Additional Questions   Roomed by MATEUS GUIDRY   Accompanied by self     History of Present Illness       Reason for visit:  Getting a sleep specialist referal  Symptom onset:  More than a month  Symptoms include:  Body needs an excessive amount of sleep to feel rested (12 hours), can't fall asleep without multiple medications, and when I do fall asleep , I repeatedly wake back up.  Symptom intensity:  Severe  Symptom progression:  Worsening  Had these symptoms before:  Yes  Has tried/received treatment for these symptoms:  No    He eats 2-3 servings of fruits and vegetables daily.He consumes 0 sweetened beverage(s) daily.He exercises with enough effort to increase his heart rate 9 or  "less minutes per day.  He exercises with enough effort to increase his heart rate 3 or less days per week.   He is taking medications regularly.     Review of Systems  HEENT, cardiovascular, pulmonary, gi and gu systems are negative, except as otherwise noted.      Objective    /67 (BP Location: Right arm, Cuff Size: Adult Large)   Pulse 93   Resp 15   Ht 1.72 m (5' 7.72\")   Wt 61.7 kg (136 lb)   SpO2 100%   BMI 20.85 kg/m    Body mass index is 20.85 kg/m .  Physical Exam   GENERAL: alert and no distress, anxious looking  RESP: lungs clear to auscultation - no rales, rhonchi or wheezes  CV: regular rate and rhythm,  no murmur, click or rub, no peripheral edema  MS: no gross musculoskeletal defects noted, no edema  PSYCH: mentation appears normal, affect flat    Signed Electronically by: Manuelito Delacruz MD    "

## 2024-03-19 NOTE — PATIENT INSTRUCTIONS
Pediatric Neurology     [facility]   Pediatric Specialty Clinic      Pediatric Call Center [phone]  Kori Woods RN  Care Coordinator:  [phone]    After Hours and Emergency:  [phone]    Prescription renewals:  your pharmacy must fax request to [phone]  Please allow 2-3 days for prescriptions to be authorized    Scheduling numbers for common referrals:      [phone]      Neuropsychology:  [phone]    If your physician has ordered an x-ray or MRI, you may schedule this test at the , or call [phone] to schedule.   Detail Level: Simple Initiate Treatment: Bactrim  mg-160 mg tablet BID\\nQuantity: 28.0 Tablet  Days Supply: 14\\nSig: Take one pill twice daily for 14 days Render In Strict Bullet Format?: No 16

## 2024-06-27 ENCOUNTER — VIRTUAL VISIT (OUTPATIENT)
Dept: SLEEP MEDICINE | Facility: CLINIC | Age: 24
End: 2024-06-27
Attending: FAMILY MEDICINE
Payer: COMMERCIAL

## 2024-06-27 VITALS — BODY MASS INDEX: 20.4 KG/M2 | WEIGHT: 130 LBS | HEIGHT: 67 IN

## 2024-06-27 DIAGNOSIS — F33.2 SEVERE EPISODE OF RECURRENT MAJOR DEPRESSIVE DISORDER, WITHOUT PSYCHOTIC FEATURES (H): ICD-10-CM

## 2024-06-27 DIAGNOSIS — F90.0 ATTENTION DEFICIT HYPERACTIVITY DISORDER (ADHD), PREDOMINANTLY INATTENTIVE TYPE: ICD-10-CM

## 2024-06-27 DIAGNOSIS — G47.21 CIRCADIAN RHYTHM SLEEP DISORDER, DELAYED SLEEP PHASE TYPE: Primary | ICD-10-CM

## 2024-06-27 DIAGNOSIS — G47.00 PERSISTENT INSOMNIA: ICD-10-CM

## 2024-06-27 DIAGNOSIS — F41.9 ANXIETY: ICD-10-CM

## 2024-06-27 PROCEDURE — 99205 OFFICE O/P NEW HI 60 MIN: CPT | Mod: 95 | Performed by: INTERNAL MEDICINE

## 2024-06-27 RX ORDER — RAMELTEON 8 MG/1
TABLET ORAL
COMMUNITY
Start: 2024-01-11

## 2024-06-27 ASSESSMENT — SLEEP AND FATIGUE QUESTIONNAIRES
HOW LIKELY ARE YOU TO NOD OFF OR FALL ASLEEP WHILE SITTING AND TALKING TO SOMEONE: WOULD NEVER DOZE
HOW LIKELY ARE YOU TO NOD OFF OR FALL ASLEEP IN A CAR, WHILE STOPPED FOR A FEW MINUTES IN TRAFFIC: WOULD NEVER DOZE
HOW LIKELY ARE YOU TO NOD OFF OR FALL ASLEEP WHILE WATCHING TV: WOULD NEVER DOZE
HOW LIKELY ARE YOU TO NOD OFF OR FALL ASLEEP WHILE SITTING INACTIVE IN A PUBLIC PLACE: WOULD NEVER DOZE
HOW LIKELY ARE YOU TO NOD OFF OR FALL ASLEEP WHILE SITTING AND READING: WOULD NEVER DOZE
HOW LIKELY ARE YOU TO NOD OFF OR FALL ASLEEP WHILE LYING DOWN TO REST IN THE AFTERNOON WHEN CIRCUMSTANCES PERMIT: WOULD NEVER DOZE
HOW LIKELY ARE YOU TO NOD OFF OR FALL ASLEEP WHILE SITTING QUIETLY AFTER LUNCH WITHOUT ALCOHOL: WOULD NEVER DOZE
HOW LIKELY ARE YOU TO NOD OFF OR FALL ASLEEP WHEN YOU ARE A PASSENGER IN A CAR FOR AN HOUR WITHOUT A BREAK: WOULD NEVER DOZE

## 2024-06-27 ASSESSMENT — PAIN SCALES - GENERAL: PAINLEVEL: MODERATE PAIN (4)

## 2024-06-27 ASSESSMENT — PATIENT HEALTH QUESTIONNAIRE - PHQ9: SUM OF ALL RESPONSES TO PHQ QUESTIONS 1-9: 20

## 2024-06-27 NOTE — PROGRESS NOTES
"  [facility] [address] [address]  Phone: [phone]    Patient:  Elan Rao, Date of birth 2000  Date of Visit:  06/27/2024  Referring Provider Manuelito RAY [facility]   Outpatient Sleep Medicine Visit - Video Clinical Encounter  June 27, 2024    Name: Elan Rao MRN# [id number]   Age: 24 year old YOB: 2000     Date of Consultation: June 27, 2024  Consultation is requested by: Manuelito Delacruz MD  [address] [place] [address]  Primary care provider: Brittney - [facility]             Assessment and Plan:   Elan Rao is a 24 year old male with history of major depression, generalized anxiety disorder, bipolar disorder, attention deficit hyperactivity disorder on lisdexamfetamine and amphetamine-dextroamphetamine XR was seen for complaints of persistent \"Insomnia\" requiring long-term use of sedative hypnotics including clonidine, trazodone 100 mg and as needed use of melatonin, ramelteon 8 mg and occasional diazepam 5 mg.  Elan history is most suggestive of underlying circadian rhythm misalignment with more than 5 hours of phase delay in his sleep initiation.  This can present as initiation insomnia and poor sleep maintenance.  In the past, this has required incremental use of sedative hypnotics to improve sleep latency but has resulted in residual drowsiness requiring use of stimulants during daytime.    Circadian rhythm-delayed sleep phase.  Since his early teenage, Elan usual sleep time has been around 2 AM.  During his school years with highly regimented daytime schedule this led to significant sleep deprivation that likely contributed towards his school and work performance, and overall mental health.  His current sleep cycle on his off  workdays started 2 AM with his natural wake time around 12:30 PM to 1 PM.  He does appear to have an increased sleep " requirement of up to 10 hours either due to underlying delayed sleep phase or due to residual effect of sedative hypnotics.  He has been using lisdexamfetamine along with amphetamine/dextroamphetamine XR which can mask the symptoms and furthermore, impact the quality of his sleep.  Poor sleep.  This is multifactorial, mostly impacted by circadian rhythm-delayed sleep phase, underlying bipolar disorder, ongoing use of psychoactive medications and use of neuro stimulants.  Furthermore, he does have inconsistent sleep and wake cycle to adjust to his work schedule that leads to interrupted and insufficient sleep affecting his sleep homeostasis.  Insomnia.  Reported both as sleep initiation and maintenance type. The likely trigger appears to be the misalignment of his circadian rhythm, being perpetuated by maladaptive behavior and medications.    Comorbid Diagnoses:    History of bipolar disorder.  Major depression.  Generalized anxiety disorder.  Tourette's syndrome.    Summary Recommendations:    Elan is advised to maintain consistent bed and wake time which is most close to his intrinsic circadian rhythm.  During work nights, he should not attempt to sleep before 2 AM.  He is advised not to take trazodone and clonidine while at work.  If possible, ambient light should be avoided during his commute back home.  He can take these medications at the end of his shift once he is back at his home with an attempt to sleep around 6:30 AM with a plan to sleep for at least 7 to 8 hours.  On his off days he should continue to maintain his sleep time at 2 AM with wake time around 12 AM.  Objective evaluation of his sleep wake cycle using actigraphy over 2 weeks.  A formal consultation with sleep psychologist has been requested for cognitive behavioral therapy for insomnia in context of his underlying mental health and ongoing use of psychoactive medications.  Sleep hygiene with focus on bed and wake time consistency was discussed  and recommended.  Stimulus control was explained and advised.  He is advised not to take ramelteon at this time.    Summary Counseling:    Check out [email or web address]           History of Present Illness:     Elan Rao is a 24 year old male with history of generalized anxiety disorder, major depression, previous diagnosis of bipolar disorder, Tourette's syndrome has been treated for chronic insomnia since his childhood.  Since his middle school he has been a late night sleeper with his natural tendency to sleep around 2 AM.  During school years he was able to wake up around 7 AM.  He was diagnosed with attention deficit hyperactivity disorder and was started on stimulant.  In 2020 with his persistent complaint of difficulty falling asleep, he noted that an extra dose of clonidine would help him fall asleep at an earlier time which was desired to keep up with his work schedule.  There trazodone was added and over time there is sedated-hypnotic agents have been used to help him fall and stay asleep.  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  He has a 10-hour work shift which starts at 8 PM and ends at 6 AM.  He is allowed to sleep at work which he attempts around midnight after taking his dose of 0.1 mg of clonidine and 50 mg of trazodone.  On most nights he will have some difficulty falling asleep up until 2 AM.  He usually wakes up around 5:30 AM with an alarm and after the end of his shift will reach home around 6:30 AM.  He takes another 0.1 mg of clonidine along with 50 mg of trazodone and will attempt to sleep with some difficulty.  He usually will fall asleep around 8:30 AM and will wake between 12:30 PM to 1 PM.  He will take a dose of his lisdexamfetamine along with 30 minutes of 10,000 Lux light exposure at 12:30 PM to 1 PM.  His main concern is his difficulty falling asleep despite using incremental doses of sedative hypnotic agents.    He lives  with his mother.  He does not snore or and denies having any episodes of gasp or snort arousals.  On his nonwork days he usually goes to bed around 2 AM and then stay asleep until 12:30 PM.      Sleep wake schedule:  Works 4 x 10 hours shifts 8:00 PM - 6:00 AM  Workday bedtime 12 AM to 2 AM awakening 5:30 AM using alarm, he will sleep again around 7:30 AM to 8:30 AM until 12:30 PM  Nonworkday bedtime 2 AM awakening 12:30 PM  Awakenings 0-1 for 1-2 minutes/week  Naps: See above regarding his sleep schedule.     Thomas Sleepiness Scale: 0/24    CYRIL Total Score: 23           Medications:     Current Outpatient Medications   Medication Sig Dispense Refill    buPROPion (WELLBUTRIN XL) 300 MG 24 hr tablet Take 300 mg by mouth every morning      cloNIDine (CATAPRES) 0.1 MG tablet Take 1 tablet (0.1 mg) by mouth At Bedtime. May also take 0.5 tablets (0.05 mg) at bedtime as needed, may repeat once (for insomnia in the middle of the night). 135 tablet 3    diazepam (VALIUM) 5 MG tablet Take 10 mg by mouth 3 times daily as needed      lisdexamfetamine (VYVANSE) 30 MG capsule Take 1 capsule by mouth at bedtime      Melatonin (MELATONIN TR) 10 MG TBCR Take 1 tablet by mouth every evening 30 tablet 11    mirtazapine (REMERON) 15 MG tablet Take 15 mg by mouth At Bedtime      ramelteon (ROZEREM) 8 MG tablet       amphetamine-dextroamphetamine (ADDERALL XR) 15 MG 24 hr capsule  (Patient not taking: Reported on 8/1/2023)      amphetamine-dextroamphetamine (ADDERALL XR) 20 MG 24 hr capsule Take 1 capsule by mouth every morning (Patient not taking: Reported on 2/23/2024)      amphetamine-dextroamphetamine (ADDERALL) 5 MG tablet Take 10 mg by mouth every evening Takes in afternoon. (Patient not taking: Reported on 8/1/2023)      lamoTRIgine (LAMICTAL) 100 MG tablet Take 2 tablets by mouth daily (Patient not taking: Reported on 7/27/2023)      mirtazapine (REMERON) 15 MG tablet Take 1 tablet by mouth At Bedtime (Patient not taking:  Reported on 8/1/2023)      OLANZapine (ZYPREXA) 5 MG tablet Take 5 mg by mouth every evening (Patient not taking: Reported on 2/23/2024)      propranolol (INDERAL) 10 MG tablet Take 10 mg by mouth 2 times daily (Patient not taking: Reported on 7/27/2023)      propranolol (INDERAL) 20 MG tablet Take 1 tablet (20 mg) by mouth 2 times daily (Patient not taking: Reported on 2/23/2024) 60 tablet 0    QUEtiapine (SEROQUEL) 50 MG tablet Take 1 Tablet(s) once a day (at bedtime)      traZODone (DESYREL) 50 MG tablet Take 50 mg by mouth At Bedtime (Patient not taking: Reported on 2/23/2024)      traZODone (DESYREL) 50 MG tablet Take 1 tablet by mouth 1 hour before bedtime (Patient not taking: Reported on 2/23/2024)      venlafaxine (EFFEXOR XR) 37.5 MG 24 hr capsule Take 1 capsule (37.5 mg) by mouth daily for 14 days, THEN 2 capsules (75 mg) daily for 14 days. (Patient not taking: Reported on 7/27/2023) 42 capsule 1     No current facility-administered medications for this visit.        No Known Allergies         Past Medical History:     Does not need 02 supplement at night   Past Medical History:   Diagnosis Date    Tourette syndrome              Past Surgical History:    No h/o  upper airway surgery  Past Surgical History:   Procedure Laterality Date    NO HISTORY OF SURGERY              Physical Examination:     Vitals:  No vitals were obtained today due to virtual visit.    Physical Exam   EYES: Eyes grossly normal to inspection.  No discharge or erythema, or obvious scleral/conjunctival abnormalities.  SKIN: Visible skin clear. No significant rash, abnormal pigmentation or lesions.  NEURO: Cranial nerves grossly intact.  Mentation and speech appropriate for age.  GENERAL: Healthy, alert and no distress  RESP: No audible wheeze, cough, or visible cyanosis.  No visible retractions or increased work of breathing.    PSYCH: Mentation appears normal, affect normal/bright, judgement and insight intact, normal speech and  appearance well-groomed.    Copy to: [facility]    Total of 60 minutes of time was spent with patient, this included the interview and exam, and review of the chart/labs/imaging/sleep study/PAP therapy data on 06/27/2024. Greater than 50% of which was spent counseling and coordinating care.     Meenu Lange MD 6/27/2024     [facility]   [address], [address]   [address] [place], [place] [address] [address]   Appointments: [phone]

## 2024-06-27 NOTE — NURSING NOTE
Is the patient currently in the state of [place]? YES    Visit mode:VIDEO    If the visit is dropped, the patient can be reconnected by: VIDEO VISIT: Send to e-mail at: [email or web address]    Will anyone else be joining the visit? NO  (If patient encounters technical issues they should call [phone]    How would you like to obtain your AVS? MyChart    Are changes needed to the allergy or medication list? Pt stated no changes to allergies and Pt stated no med changes    Are refills needed on medications prescribed by this physician? NO  Has patient had flu shot for current/most recent flu season? If so, when? No    Reason for visit: Consult  Depression Response    Patient completed the PHQ-9 assessment for depression and scored >9? Yes-20  Question 9 on the PHQ-9 was positive for suicidality? No  Does patient have current mental health provider? Yes    Is this a virtual visit? Yes   Does patient have suicidal ideation (positive question 9)? No - offer to place Mental Health Referral.  Patient declined referral/not needed-has pschiatrist and a therapist     I personally notified the following: visit provider           Fernanda CUEVAS

## 2024-06-27 NOTE — PATIENT INSTRUCTIONS
"DELAYED SLEEP PHASE  What is it?   Delayed sleep phase disorder (DSP) is a circadian rhythm disorder. It consists of a typical sleep pattern that is \"delayed\" by two or more hours. This delay occurs when one s internal sleep clock (circadian rhythm) is shifted later at night and later in the morning. Once sleep occurs, the sleep is generally normal. But the delay leads to a pattern of sleep that is later than what is desired or what is considered socially acceptable. This pattern can be a problem when it interferes with work or social demands.  A person with DSP is likely to prefer late bedtimes and late wake-up times. When left to his or her own schedule, a person with DSP is likely to have a normal amount and quality of sleep. It simply occurs at a delayed time. One sign of this disorder is difficulty falling asleep until late at night. Another sign is having a hard time getting out of bed in the morning for work or school. These signs can make DSP look like insomnia. Daytime functioning can be severely impaired by DSP. It can lead to excessive sleepiness and fatigue. When able to sleep on their own schedules, people with DSP often stay up until they get tired and then sleep until they awaken late in the morning. In this case, they tend to have no complaint of difficulty falling to sleep or feeling poorly during the day.         Who gets it?   The exact rate of DSP is unknown in the general population. It is much more common in teens and young adults. From 7% to 16% of them may have it. DSP is likely to be found in 10% of people with a complaint of chronic insomnia. People who tend to be \"evening types\" or \"night owls\" are likely to develop DSP.     There is likely to be some genetic component. Some environmental factors may also be involved. A lack of exposure to morning sunlight may make it worse. Too much exposure to bright evening sunlight may also increase symptoms of DSP. A family history of DSP is common in " about 40% of people with the disorder.     How do I know if I have it?   Is there a delay in your sleep pattern in relation to your desired sleep and wake times? Do you have trouble falling asleep at the desired time of night? Are you then unable to awaken at the desired or socially acceptable time?   When left to your own sleep schedule, do you have a normal duration and quality of sleep? Does this sleep occur in a stable, but delayed time period in relation to what is desired or socially acceptable?   Have you had this kind of stable but delayed sleep time for at least seven days?   If you answered  yes  to these questions, then you may have delayed sleep phase disorder.  It is also important to know if there is something else that is causing your sleep problems. They may be a result of one of the following:  Another sleep disorder   A medical condition   Medication use   A mental health disorder   Substance abuse            Do I need to see a sleep specialist?       LIGHT THERAPY  What is it?  Light therapy is a treatment used for people who suffer from circadian rhythm sleep disorders. Your body has an internal clock that tells it when it is time to be asleep and when it is time to be awake.     This clock is located in the brain just above an area where the nerves travel to the eyes. This area is called the SCN. Your clock controls the  circadian rhythms  in your body. These rhythms include body temperature, alertness and the daily cycle of many hormones.     The word  circadian  means to occur in a cycle of about 24 hours. Circadian rhythms make you feel sleepy or alert at regular times every day. Some people have a circadian rhythm sleep disorder. This causes their natural sleep time to overlap with regular awake activities such as work or school.   Among other factors, your clock is  set  by your exposure to bright light such as sunlight. Exposure to bright light or  light therapy  is one method used to  treat people with a circadian rhythm sleep disorder.     The goal for treating patients who have circadian rhythm problems is to combine a healthy sleep pattern with an internal clock that is set at the right time. This will allow them to enjoy the benefits of good sleep.     Light therapy can help someone  re-set  a clock that is off. Regular sleep patterns help to keep the clock set at the new time. Light therapy is only part of a treatment plan that should be guided by a doctor who is familiar with sleep disorders.   Light therapy is used to expose your eyes to intense but safe amounts of light for a specific and regular length of time. In many places, sunlight is not available at the proper time to be used as treatment.     Artificial light may be used to affect the body clock in the same way that sunlight does. New advances continue to be made in this field. Currently, products that are used for light therapy fit into four basic groups:   1. Light Box   This is the most common tool that is used in light therapy. The box houses several tubes that produce extremely bright light. It sits on top of a table or desk and plugs into the wall.     During a treatment session, you have to keep within a certain distance of the box. Usually, you will be about 18 to 24 inches away from it. It does not require you to look directly into the light. Instead, you simply face in the direction of the box.     You are able to do other activities during the session. Ideally, you would work on papers or read something that is in the area being lit up. This will allow the light to be received by your eyes. Your body takes in this information and uses it to regulate the rhythms that control when you sleep and when you wake.   Earlier models of light boxes put out 2,500 to 5,000 lux of light. Lux is a measure of how much light falls on your eyes. These sessions could take two or three hours. Now, many boxes produce 10,000 lux of light.  This allows sessions to take as little as 15 to 30 minutes.     More than one session may be needed each day. It depends upon your body, your need, and the strength of light being used. The key is to use the light at the right time of day and for the right amount of time. This is based upon the sleep disorder you want to correct.     New models are also safer, protecting you from harmful UV rays. Some models are now focusing on a specific bandwidth of light. Light boxes can be purchased in a variety of makes and models. Some are now being made much smaller so they are easier to take with you. General prices range from $200 - $500 per light box.   2. Desk Lamp   This serves the same purpose as a light box, but it is made to look like a normal lamp. It blends in better when used in an office setting.   3. Light Visor   This is a light source that is worn on your head and hangs over your eyes. It looks much like a tennis visor. It is made so that you can move around during sessions. The strength of visor lights also varies from 3,000 to 10,000 lux.   4. Caitlin Simulator     These lights gradually make a dark room brighter over a set period of time. This is meant to mimic the sunrise. Some people may find that this helps them wake up in the morning. Models may also slowly dim to copy a sunset.          Who gets it?  Bright light therapy is used for people who suffer from circadian rhythm disorders. The time of day when the light is used will depend upon the disorder it is meant to correct. These disorders include the followin. Delayed sleep phase disorder   This causes people to fall asleep much later at night than is normal. As a result, they also wake up later in the morning. This sleep pattern can interfere with their schedule of activities for the day. To correct delayed sleep phase, light treatment takes place during the early morning hours.   2. Advanced sleep phase disorder   This causes people to fall  asleep much earlier at night than is normal. They also wake up earlier in the morning. To correct it, light treatment takes place early at night.   3. Free-running or Non-24-hour sleep-wake rhythm   People with this disorder fall asleep at a different time each day. For example, you may fall asleep at 10 p.m. one day, Midnight the next day, 2 a.m. the next, etc. This most often occurs in people who are blind. Light therapy may help blind people, even if they can't perceive visible light. Studies show that light treatment may be useful in the early morning hours.   4. Jet lag   Jet lag causes people to have problems with sleep when they have crossed many time zones on a flight. Light therapy in the morning may help when traveling east. For travel to the west, bright light in the evening may help reduce jet lag.   5. Shift Work   This sleep disorder occurs due to a work schedule, such as night shift, that takes place during the time when most people are sleeping. This schedule requires you to work when your body wants to sleep. Then you have to try to sleep when your body expects to be awake. Correcting it can be a hard problem to solve. Changing work schedules, days off, and social activities can alter your exposure to light from day to day. Frequent changes in your sleep times make it hard to re-set your internal clock. In general, using light treatment in the evening should help someone who regularly works nights. In this case, you would also want to avoid daylight when you come off work and go to bed. Dark sunglasses or special goggles can help.   6. Seasonal affective disorder (SAD)   SAD is a mood disorder that can cause people to feel sad and lack energy during the dark months of winter. A similar and milder version is often called the  Winter Blues.  In severe forms, sadness may be caused by depression. Light therapy is thought to be useful as one of the treatments for seasonal mood disorders and depression.  Depression is also treated with medications. You should consult your doctor if you are having serious problems with sadness.         Possible side effects?  Light therapy has a good record of safety. It does not seem to produce any major side effects. Light therapy should always be used within the proper limits for intensity and time. Minor side effects may include the following:   Eye irritation and dryness   Headache   Nausea   Dryness of skin   To reduce these side effects, begin the light therapy very slowly. Give your body time to get used to it. The use of a humidifier can also help with irritations caused by dryness. Talk to your doctor or a sleep specialist before beginning use.       Yes. It is easy to confuse DSP with normal variations of sleep and other types of insomnia. Consulting with a sleep specialist is your best bet to help clarify current sleep problems. He or she will also be able to help you develop a plan to correct these problems. A specialist can assess the factors that cause and make this problem worse. These include both social and behavioral factors.     What will the doctor need to know?   The sleep doctor will do a thorough physical exam. He or she will also discuss with you the history of these sleep problems. It would be helpful to keep a sleep diary prior to seeing a sleep doctor. Bring this information with you to the appointment. A sleep diary is a systematic way to track your sleep pattern. You record the time you get into bed, the time required to fall asleep, and the time you wake up in the morning. Sleep diaries often show a regular pattern of difficulty falling to sleep. They often show few or no awakenings once asleep. They also tend to show a sleep duration that is reduced during the work week and lengthy on the weekend.     Will I need to take any tests?   An overnight sleep study is not normally needed for someone who suffers from DSP. Your doctor may have you do an  overnight sleep study if your problem is severely disturbing your sleep. This study is called a polysomnogram. It charts your brain waves, heart rate, and breathing as you sleep. It also records how your arms and legs move. This study will help determine if there are any objective sleep disorders related to your sleep problem.     How is it treated?   The most accepted treatment for DSP is what is called chronotherapy. This is a method of behavioral treatment. Your bedtime is delayed by about three hours per day for five or six consecutive days. Once the desired bedtime is reached the schedule is frozen. This schedule needs to be maintained rigidly at this point.  Bright light therapy is another proven technique for changing one s internal circadian rhythms. But its specific use for DSP has not been well validated. In theory, exposure to bright light should occur shortly after waking up at the desired time in the morning. Then bright outdoor light in the evening hours should be avoided.              CBT-I Introductory Module    Understanding Sleep and Insomnia    Most people have trouble sleeping at some point in their life.   Chronic insomnia means you have had trouble falling asleep and/or staying asleep for at least the past three months.  Despite allowing enough time for sleep, it is affecting how you feel. You are not alone.  It is estimated that 10-15% of adults experience chronic insomnia.     Cognitive Behavioral Therapy for Insomnia (CBT-I)    Consistent with the guidelines of the American College [employer] Physicians, [facility] recommends  Cognitive Behavioral Therapy for Insomnia (CBT-I) as the first-line treatment for insomnia.  CBT-I targets factors that lead to long-term insomnia:    Behavioral Conditioning    When you lay awake in bed over many nights, your body actually becomes trained or 'conditioned' to be awake during the night.  It makes the bed associated with alertness instead of  sleepiness.    Habits that weaken your body's sleep drive and circadian sleep rhythm    Changing sleep schedules, extended time in bed, using mobile devices and computers close to bedtime, and naps too late in the day can harm your sleep at night.    Emotional and Physical Arousal    Things such as worrying about sleep, stress, drinking too much caffeine, and not winding down before bed can interfere with your body's natural sleep drive.    Understanding Sleep and Insomnia    [facility] CBT-I is a four-part program that teaches you the skills needed for a better night's sleep. The first step in your program is learning a bit about sleep and insomnia.      The Basics of Sleep    How does sleep help us?    Sleep, like food and water, is something we need every day. The purpose of sleep is still not exactly clear, but sleep experts agree we need consistent quality sleep to function at our best. There is evidence that sleep helps maintain brain and body functions.  It helps maintain thinking ability and mood.  Sleep is actually a very active part of life. Sleep occurs in four stages that cycle every  minutes throughout the night. We get our deepest sleep during the first few hours of sleep.  During the last half of our sleep, we usually get the bulk of our REM (Rapid Eye Movement) sleep.  REM sleep is when most of our dreaming occurs.    How much sleep do we need?    Sleep needs vary from person to person. Most adults need between 6-8 hours of sleep.  Sleeping too little or too much may be a health risk.  As we age, most people report their sleep gets lighter, earlier, shorter, and more restless.     What Controls Our Sleep?    The three things that regulate your sleep are your sleep drive, biological clock and your arousal system (emotional and physical).  Together these make us feel alert during the day and promote sleep at night.    Your sleep drive depends on how long you have been awake. It is lowest  when you first wake up.  Sleep drive gradually increases as the day goes on.  The longer time you are awake the easier it is to fall asleep.  Sleeping gradually reduces your sleep drive. That is why napping in the evening or close to bed can make it harder to sleep at night.    Your biological clock promotes wakefulness during the day and sleep at night.        Understanding Insomnia    What causes insomnia?    Some people have a greater predisposition to developing insomnia due to genetics, personality or age. A host of things can trigger or precipitate it: jet lag, working a different shift, medication, or the onset of a medical or mental health condition.             Insomnia and Your Arousal System    Mental activity, emotions and physical symptoms can make your brain too active to sleep by masking the strength of your sleep drive. Your brain's arousal system not only triggers insomnia, it plays a role in maintaining it as well.  Common sources of arousal include:    Worry about sleep in bed  An active mind concerned about unfinished tasks  Anxiety, Stress and Depression  Pain     What perpetuates insomnia?    Short-term insomnia can become chronic when you begin to feel fearful, worried and  on guard  about sleep loss. As you spend more time in bed or try forcing yourself to sleep your bed becomes linked with wakefulness.  This is why people with insomnia commonly report feeling tired before bed but suddenly more alert when getting into bed.  This type of  conditioning  along with unhelpful sleep habits maintains the insomnia even when the triggering event has resolved.    Tracking your Sleep    Sleep tracking is an essential part of training yourself to sleep better and monitoring your progress.  There are several ways to keep track of your sleep habits.     Insomnia  Inderjit    The Insomnia  inderjit is a convenient way to keep track of your sleep prior to and during treatment.  Simply download the free inderjit  on your Apple or Android phone and record your information each morning.   The data you enter should be your recollection of the past nigh of sleep. Do not watch or monitor the clock in the middle of the night while keeping your sleep diary.     The josey also includes training and sleep schedule recommendations.  We recommend you use only the tracking function unless instructed by your provider. You can email your data to yourself prior to your visit by using the [facility] User Data function found in the Settings Section.  It is important that you have your data available to review with your provider at the time of your visit.             [employer] Sleep Diary    You can also track your sleep using the [employer] paper sleep diary.  You can upload your sleep diary and send it via a [employer] message, fax it to [phone], or have it with you at the time of your visit.          Mobile and Wearable Sleep Tracking Devices    There are many sleep tracking devices and mobile applications that estimate the timing and quantity of sleep by measuring body movement.  Though many of these devices claim to measure the depth or stages of sleep, they cannot measure brain wave activity or other indicators required to determine the actual stages of sleep.  They are helpful in estimating the timing and total amount of time you sleep.    CBT-I and Sleeping Pills    Sleep medications can be helpful in the short-term but often stop working in the long-term.  Sleeping pills treat symptoms and not the underlying cause of insomnia.  They also can have side effects that last well into the day. Abruptly stopping sleeping pills can cause temporary rebound insomnia and lead to increased distress about sleeplessness.  This in turn strengthens the belief that pills are necessary for sleep.    Many patients choose to discontinue sleeping pills prior to beginning CBT-I.  You should talk to your prescribing provider before tapering or  discontinuing sleep medication.

## 2024-06-27 NOTE — PROGRESS NOTES
Virtual Visit Details    Type of service:  Video Visit     Originating Location (pt. Location): Home    Distant Location (provider location):  On-site  Platform used for Video Visit: [employer]

## 2024-06-28 ENCOUNTER — TELEPHONE (OUTPATIENT)
Dept: SLEEP MEDICINE | Facility: CLINIC | Age: 24
End: 2024-06-28
Payer: COMMERCIAL

## 2024-06-28 NOTE — TELEPHONE ENCOUNTER
Reason for Call:  Appointment Request    Patient requesting this type of appt:  actigraphy    Requested provider:  NA    Reason patient unable to be scheduled: Needs to be scheduled by clinic    When does patient want to be seen/preferred time:  Routine    Comments: actigraphy    Could we send this information to you in [facility] or would you prefer to receive a phone call?:   Patient would prefer a phone call   Okay to leave a detailed message?: No at Cell number on file:    Telephone Information:   Mobile [phone]   Mobile [phone]       Call taken on 6/28/2024 at 1:13 PM by Sarah Leyva

## 2024-07-01 ENCOUNTER — TELEPHONE (OUTPATIENT)
Dept: SLEEP MEDICINE | Facility: CLINIC | Age: 24
End: 2024-07-01
Payer: COMMERCIAL

## 2024-07-02 ENCOUNTER — PATIENT OUTREACH (OUTPATIENT)
Dept: CARE COORDINATION | Facility: CLINIC | Age: 24
End: 2024-07-02
Payer: COMMERCIAL

## 2024-07-16 ENCOUNTER — PATIENT OUTREACH (OUTPATIENT)
Dept: CARE COORDINATION | Facility: CLINIC | Age: 24
End: 2024-07-16
Payer: COMMERCIAL

## 2024-07-17 ENCOUNTER — TELEPHONE (OUTPATIENT)
Dept: SLEEP MEDICINE | Facility: CLINIC | Age: 24
End: 2024-07-17
Payer: COMMERCIAL

## 2024-07-17 NOTE — TELEPHONE ENCOUNTER
Reason for Call:  Other appointment    Detailed comments: PT IS IN NEED OF A SLEEP STUDY SCHEDULED ASAP HAS BEEN WAITING AWHILE TO GET THIS SCHEDULE PLEASE CALL AND ADVISE PT  SANDRA BENDER WOULD LIKE A CALL BACK TO SCHEDULE     Phone Number Patient can be reached at: Other phone number:  [phone]    Best Time: ANYTIME    Can we leave a detailed message on this number? YES    Call taken on 7/17/2024 at 2:29 PM by Elías Pop

## 2024-07-18 ENCOUNTER — TELEPHONE (OUTPATIENT)
Dept: SLEEP MEDICINE | Facility: CLINIC | Age: 24
End: 2024-07-18
Payer: COMMERCIAL

## 2024-07-30 ENCOUNTER — OFFICE VISIT (OUTPATIENT)
Dept: SLEEP MEDICINE | Facility: CLINIC | Age: 24
End: 2024-07-30
Payer: COMMERCIAL

## 2024-07-30 DIAGNOSIS — G47.00 PERSISTENT INSOMNIA: ICD-10-CM

## 2024-07-30 DIAGNOSIS — G47.21 CIRCADIAN RHYTHM SLEEP DISORDER, DELAYED SLEEP PHASE TYPE: ICD-10-CM

## 2024-08-13 ENCOUNTER — DOCUMENTATION ONLY (OUTPATIENT)
Dept: SLEEP MEDICINE | Facility: CLINIC | Age: 24
End: 2024-08-13
Payer: COMMERCIAL

## 2024-09-08 ENCOUNTER — HEALTH MAINTENANCE LETTER (OUTPATIENT)
Age: 24
End: 2024-09-08

## 2025-01-14 ENCOUNTER — TELEPHONE (OUTPATIENT)
Dept: SLEEP MEDICINE | Facility: CLINIC | Age: 25
End: 2025-01-14
Payer: COMMERCIAL

## 2025-01-14 NOTE — TELEPHONE ENCOUNTER
Pt was called to reschedule appt on 2/13/25 with Dr Bryant. M for them to call back. [employer] message sent also.      Franca Almanza    [facility]

## 2025-01-28 ENCOUNTER — PATIENT OUTREACH (OUTPATIENT)
Dept: CARE COORDINATION | Facility: CLINIC | Age: 25
End: 2025-01-28
Payer: COMMERCIAL